# Patient Record
Sex: FEMALE | Race: WHITE | Employment: UNEMPLOYED | URBAN - METROPOLITAN AREA
[De-identification: names, ages, dates, MRNs, and addresses within clinical notes are randomized per-mention and may not be internally consistent; named-entity substitution may affect disease eponyms.]

---

## 2017-01-06 ENCOUNTER — HOSPITAL ENCOUNTER (OUTPATIENT)
Dept: PHYSICAL THERAPY | Age: 57
Discharge: HOME | End: 2017-01-06
Payer: COMMERCIAL

## 2017-01-06 DIAGNOSIS — M54.32 LEFT SIDED SCIATICA: ICD-10-CM

## 2017-01-06 PROCEDURE — 97161 PT EVAL LOW COMPLEX 20 MIN: CPT

## 2017-01-06 PROCEDURE — 97140 MANUAL THERAPY 1/> REGIONS: CPT

## 2017-01-06 PROCEDURE — 97110 THERAPEUTIC EXERCISES: CPT

## 2017-01-06 NOTE — PROGRESS NOTES
Ambulatory/Rehab Services H2 Model Falls Risk Assessment    Risk Factor Pts. ·   Confusion/Disorientation/Impulsivity  []    4 ·   Symptomatic Depression  []   2 ·   Altered Elimination  []   1 ·   Dizziness/Vertigo  []   1 ·   Gender (Male)  []   1 ·   Any administered antiepileptics (anticonvulsants):  []   2 ·   Any administered benzodiazepines:  []   1 ·   Visual Impairment (specify):  []   1 ·   Portable Oxygen Use  []   1 ·   Orthostatic ? BP  []   1 ·   History of Recent Falls (within 3 mos.)  []   5     Ability to Rise from Chair (choose one) Pts. ·   Ability to rise in a single movement  [x]   0 ·   Pushes up, successful in one attempt  []   1 ·   Multiple attempts, but successful  []   3 ·   Unable to rise without assistance  []   4   Total: (5 or greater = High Risk) 0     Falls Prevention Plan:   []                Physical Limitations to Exercise (specify):   []                Mobility Assistance Device (type):   []                Exercise/Equipment Adaptation (specify):    ©2010 [facility] [address] Patent #[id number].  Federal Law prohibits the replication, distribution or use without written permission from [facility] [employer]

## 2017-01-06 NOTE — PROGRESS NOTES
Lizzy Pack  : 1960 [facility] Center at [facility]  [address], [address], [place] [address]  [phone]   [phone]         OUTPATIENT PHYSICAL THERAPY: Initial Assessment 2017   ICD-10: Treatment Diagnosis: low back pain (M54.5)  Muscle spasm of back (M62.830)  Precautions/Allergies:   Latex, natural rubber; Adhesive; Lortab [hydrocodone-acetaminophen]; and Tylox [oxycodone-acetaminophen]   Fall Risk Score: 0 (? 5 = High Risk)  MD Orders: evaluate and treat  MEDICAL/REFERRING DIAGNOSIS:  Left sided sciatica [M54.32]   DATE OF ONSET: 2016   REFERRING PHYSICIAN: Julio Parish MD  RETURN PHYSICIAN APPOINTMENT: to see MD in 2 weeks for other problem    Initial Assessment:  Ms. Denise Christianson presents with functional limitation due to left sided low back pain. Through evaluation she demonstrate decreased accessory vertebral movement through lower lumbar spinal segments and left lateral lumbar shift along with muscle imbalances. She is showing no nerve involvement currently. She would highly benefit from skilled PT to address below problems. PROBLEM LIST (Impacting functional limitations):  1. Decreased Strength  2. Decreased ADL/Functional Activities  3. Decreased Transfer Abilities  4. Increased Pain  5. Decreased Flexibility/Joint Mobility  6. Decreased [place] with Home Exercise Program   INTERVENTIONS PLANNED:  1. Cold  2. Continuous Passive Motion (CPM)  3. Heat  4. Home Exercise Program (HEP)  5. Manual Therapy  6. Neuromuscular Re-education/Strengthening  7. Range of Motion (ROM)  8. Therapeutic Exercise/Strengthening     TREATMENT PLAN: Effective Dates: 17 TO 3/6/17. Frequency/Duration: 2 times a week for 4-6 weeks    GOALS: (Goals have been discussed and agreed upon with patient.)  SHORT-TERM FUNCTIONAL GOALS: Time Frame: 2-4 weeks   1. Pt will be independent with HEP focusing on core stability and promoting good spinal alignment.   2. Pt will report pain level does not exceed 4/10 in a 1-2 week period of time to demonstrate pain management. 3. Pt will report no increased symptoms during ADLs such as dressing lower half of body or bed mobilty. DISCHARGE GOALS: Time Frame: 6-8 weeks   1. Pt will improve Oswestry score by at least 10 points. 2. Pt will demonstrate full AROM of lumbar spine all planes without report of pain to improve functional mobility. 3. Pt will be able to return to regular activities such as spin class and walking without increased low back pain. Rehabilitation Potential For Stated Goals: Excellent    Regarding [address] [place] [address] Ramone's therapy, I certify that the treatment plan above will be carried out by a therapist or under their direction. Thank you for this referral,  Matt Suggs, PT       Referring Physician Signature: Gypsy Epps MD _________________________ Date _________            HISTORY:   Present Symptoms:    intermittent aching of left side of low back with occasional sharp instances during transitional movement. Denies LE symptoms such as pain, numbness, tingling, weakness. Aggravating Factors: sit to stand, getting out of bed, getting into car, bending, twisting, turning in bed  Relieving Factors: prone, ice, heat advil,   Pain: 3/10 presently, 9/10 worst, 0/10 best   History of Present Injury/Illness (Reason for Referral):  Hx of sciatic pain resolving with PT 6+ months ago  Pt states that her most recent back pain episode began November 2016 insidiously beginning with central low back, progressively worsening and radiating to left side. 3 days ago, symptoms significantly worsened while putting dishes away out of . Currently treating with ice, heat, advil. Reports that she has difficulty putting pants and socks/shoes on as well as performing homemaking activities. Past Medical History/Comorbidities:   Ms. 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 Ms. Bouchra Allen  has a past surgical history that includes hernia repair and skin biopsy. Social History/Living Environment:     living in home with 1 flight of stairs, with  who is currently needing assistance due to recent surgery   Prior Level of Function/Work/Activity:  Stay at home, enjoys long walks and spinning    Dominant Side:         RIGHT  Other Clinical Tests:          No clinical tests currently   Previous Treatment Approaches:          PT, ice, heat, meds   Personal Factors:          none  Current Medications:    Current Outpatient Prescriptions:     levothyroxine (SYNTHROID) 50 mcg tablet, Take 1 Tab by mouth Daily (before breakfast). Takes one tablet and alternates with one half of a tablet daily. Indications: HYPOTHYROIDISM, Disp: 90 Tab, Rfl: 3    LORazepam (ATIVAN) 0.5 mg tablet, Take 1 Tab by mouth two (2) times daily as needed. Max Daily Amount: 1 mg., Disp: 60 Tab, Rfl: 3    predniSONE (DELTASONE) 20 mg tablet, 2 PO every day x 2 then 1 PO every day x 2, Disp: 6 Tab, Rfl: 0    gabapentin (NEURONTIN) 300 mg capsule, Take 1 Cap by mouth nightly., Disp: 30 Cap, Rfl: 1    Cetirizine (ZYRTEC) 10 mg cap, Take  by mouth every evening. Indications: ALLERGIC RHINITIS, Disp: , Rfl:     OMEGA-3 FATTY ACIDS (FISH OIL CONCENTRATE PO), Take  by mouth., Disp: , Rfl:     CHOLECALCIFEROL, VITAMIN D3, (MAXIMUM D3 PO), Take  by mouth., Disp: , Rfl:     aspirin 81 mg tablet, Take 81 mg by mouth daily. , Disp: , Rfl:    Date Last Reviewed:  1/6/2017    # of Personal Factors/Comorbidities that affect the Plan of Care: 0: LOW COMPLEXITY   EXAMINATION:   Observation/Orthostatic Postural Assessment:    · Left scapula elevated compared to right   · Left illiac crest and PSIS elevated compared to right  · Left lumbar shift apparent    Palpation:    ·       Increased tone of left and right QL and paraspinals    ROM:            Lumbar spine Date:  1/6/17 Date:   Date:     Direction  Parameters Parameters Parameters   Flexion  nuetral spine only in standing     Extension  WNL      Rotation  R: 100%  L: 50% cs     Side bending  R: 25% cs  L: 100%     Hip internal rotation  R: WNL    L: limited      Hip external rotation R:WNL  L: WNL     Hip flexion  R: WNL  L: WNL cs     muscular restriction  Tight iliopsoas left         Strength:            LE DATE  1/6 DATE     Hip flexion 5/5 R:   L:    Hip Extension  5/5 R:   L:    Knee Flexion  5/5  R:   L:    Knee Extension  5/5 R:   L:    Ankle Dorsiflexion  5/5 R:   L:   Ankle Plantarflexion  5/5 R:  L:          Special Tests:          (-) SIJ testing B  Neurological Screen:        Normal   Functional Mobility:         Gait/Ambulation:  Normal        Transfers:  Normal         Bed Mobility:  Guarded supine to stand, Painful        Stairs:  Normal   Balance:          Good    Body Structures Involved:  1. Bones  2. Muscles  3. Ligaments Body Functions Affected:  1. Sensory/Pain  2. Neuromusculoskeletal  3. Movement Related Activities and Participation Affected:  1. Mobility   # of elements that affect the Plan of Care: 4+: HIGH COMPLEXITY   CLINICAL PRESENTATION:   Presentation: Stable and uncomplicated: LOW COMPLEXITY   CLINICAL DECISION MAKING:   Outcome Measure: Tool Used: Modified Oswestry Low Back Pain Questionnaire  Score:  Initial: 17/50  Most Recent: X/50 (Date: -- )   Interpretation of Score: Each section is scored on a 0-5 scale, 5 representing the greatest disability. The scores of each section are added together for a total score of 50. Medical Necessity:   · Patient is expected to demonstrate progress in strength, range of motion and functional technique to increase independence with home and recreational activities.   Reason for Services/Other Comments:  · Patient continues to require modification of therapeutic interventions to increase complexity of exercises. Use of outcome tool(s) and clinical judgement create a POC that gives a: Clear prediction of patient's progress: LOW COMPLEXITY   TREATMENT:   (In addition to Assessment/Re-Assessment sessions the following treatments were rendered)    THERAPEUTIC EXERCISE: (8 minutes):  Exercises per grid below to improve mobility and strength. Required moderate visual and manual cues to promote proper body alignment, promote proper body posture, promote proper body mechanics and promote proper body breathing techniques. Progressed resistance, range, repetitions and complexity of movement as indicated. Date:  1/6/17 Date:   Date:     Activity/Exercise Parameters Parameters Parameters   DKTC 3 x 10 sec (with breathing)     kegals Discussed for home     TrA iso with PPT 3 x 10 sec      Seated or standing lumbar sidebend (or side glide pelvis to left) Next session                          MANUAL THERAPY: (10 minutes): Joint mobilization, Soft tissue mobilization and Manipulation was utilized and necessary because of the patient's restricted joint motion, painful spasm, loss of articular motion and restricted motion of soft tissue. · CPA's and B UPA's to L2-4  · Transverse mob to lumbar vert right      MODALITIES: (0 minutes):      Treatment/Session Assessment:  Pt with 25% improvement right lumbar side bending with less pain after manual treatment. Good understanding of HEP and POC. · Post session pain: less pain with above movement   · Compliance with Program/Exercises: Will assess as treatment progresses. · Recommendations/Intent for next treatment session: \"Next visit will focus on advancements to more challenging activities\".   Total Treatment Duration:  PT Patient Time In/Time Out  Time In: 0800  Time Out: 0900    Matt Suggs PT

## 2017-01-10 ENCOUNTER — HOSPITAL ENCOUNTER (OUTPATIENT)
Dept: PHYSICAL THERAPY | Age: 57
Discharge: HOME | End: 2017-01-10
Payer: COMMERCIAL

## 2017-01-10 PROCEDURE — 97140 MANUAL THERAPY 1/> REGIONS: CPT

## 2017-01-10 NOTE — PROGRESS NOTES
Aydin Pack  [facility] 1960 [facility] [address] [address], [place] [address]  [phone]   [phone]         OUTPATIENT PHYSICAL THERAPY: Daily Note 1/10/2017   ICD-10: Treatment Diagnosis: low back pain (M54.5)  Muscle spasm of back (M62.830)  Precautions/Allergies:   Latex, natural rubber; Adhesive; Lortab [hydrocodone-acetaminophen]; and Tylox [oxycodone-acetaminophen]   Fall Risk Score: 0 (? 5 = High Risk)  MD Orders: evaluate and treat  MEDICAL/REFERRING DIAGNOSIS:  Low Back Pain   DATE OF ONSET: 2016   REFERRING PHYSICIAN: Abram Manley MD  RETURN PHYSICIAN APPOINTMENT: to see MD in 2 weeks for other problem    Initial Assessment:  Ms. Sasha Graham presents with functional limitation due to left sided low back pain. Through evaluation she demonstrate decreased accessory vertebral movement through lower lumbar spinal segments and left lateral lumbar shift along with muscle imbalances. She is showing no nerve involvement currently. She would highly benefit from skilled PT to address below problems. PROBLEM LIST (Impacting functional limitations):  1. Decreased Strength  2. Decreased ADL/Functional Activities  3. Decreased Transfer Abilities  4. Increased Pain  5. Decreased Flexibility/Joint Mobility  6. Decreased [place] with Home Exercise Program   INTERVENTIONS PLANNED:  1. Cold  2. Continuous Passive Motion (CPM)  3. Heat  4. Home Exercise Program (HEP)  5. Manual Therapy  6. Neuromuscular Re-education/Strengthening  7. Range of Motion (ROM)  8. Therapeutic Exercise/Strengthening     TREATMENT PLAN: Effective Dates: 17 TO 3/6/17. Frequency/Duration: 2 times a week for 4-6 weeks    GOALS: (Goals have been discussed and agreed upon with patient.)  SHORT-TERM FUNCTIONAL GOALS: Time Frame: 2-4 weeks   1. Pt will be independent with HEP focusing on core stability and promoting good spinal alignment.   2. Pt will report pain level does not exceed 4/10 in a 1-2 week period of time to demonstrate pain management. 3. Pt will report no increased symptoms during ADLs such as dressing lower half of body or bed mobilty. DISCHARGE GOALS: Time Frame: 6-8 weeks   1. Pt will improve Oswestry score by at least 10 points. 2. Pt will demonstrate full AROM of lumbar spine all planes without report of pain to improve functional mobility. 3. Pt will be able to return to regular activities such as spin class and walking without increased low back pain. Rehabilitation Potential For Stated Goals: Excellent    Regarding [address] [place] [address] [facility]'s therapy, I certify that the treatment plan above will be carried out by a therapist or under their direction. Thank you for this referral,  Jyothi Fowler, PT       Referring Physician Signature: Baltazar Dover MD _________________________ Date _________            HISTORY:   Present Symptoms:(at time of initial evaluation)     intermittent aching of left side of low back with occasional sharp instances during transitional movement. Denies LE symptoms such as pain, numbness, tingling, weakness. Aggravating Factors: sit to stand, getting out of bed, getting into car, bending, twisting, turning in bed  Relieving Factors: prone, ice, heat advil,   Pain: 3/10 presently, 9/10 worst, 0/10 best   History of Present Injury/Illness (Reason for Referral):  Hx of sciatic pain resolving with PT 6+ months ago  Pt states that her most recent back pain episode began November 2016 insidiously beginning with central low back, progressively worsening and radiating to left side. 3 days ago, symptoms significantly worsened while putting dishes away out of . Currently treating with ice, heat, advil. Reports that she has difficulty putting pants and socks/shoes on as well as performing homemaking activities.    Past Medical History/Comorbidities:   Ms. Tamanna Reid  has a past medical history of Cancer (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 Ms. Kassandra Hampton  has a past surgical history that includes hernia repair and skin biopsy. Social History/Living Environment:     living in home with 1 flight of stairs, with  who is currently needing assistance due to recent surgery   Prior Level of Function/Work/Activity:  Stay at home, enjoys long walks and spinning    Dominant Side:         RIGHT  Other Clinical Tests:          No clinical tests currently   Previous Treatment Approaches:          PT, ice, heat, meds   Personal Factors:          none  Current Medications:    Current Outpatient Prescriptions:     levothyroxine (SYNTHROID) 50 mcg tablet, Take 1 Tab by mouth Daily (before breakfast). Takes one tablet and alternates with one half of a tablet daily. Indications: HYPOTHYROIDISM, Disp: 90 Tab, Rfl: 3    LORazepam (ATIVAN) 0.5 mg tablet, Take 1 Tab by mouth two (2) times daily as needed. Max Daily Amount: 1 mg., Disp: 60 Tab, Rfl: 3    predniSONE (DELTASONE) 20 mg tablet, 2 PO every day x 2 then 1 PO every day x 2, Disp: 6 Tab, Rfl: 0    gabapentin (NEURONTIN) 300 mg capsule, Take 1 Cap by mouth nightly., Disp: 30 Cap, Rfl: 1    Cetirizine (ZYRTEC) 10 mg cap, Take  by mouth every evening. Indications: ALLERGIC RHINITIS, Disp: , Rfl:     OMEGA-3 FATTY ACIDS (FISH OIL CONCENTRATE PO), Take  by mouth., Disp: , Rfl:     CHOLECALCIFEROL, VITAMIN D3, (MAXIMUM D3 PO), Take  by mouth., Disp: , Rfl:     aspirin 81 mg tablet, Take 81 mg by mouth daily. , Disp: , Rfl:    Date Last Reviewed:  1/10/2017    # of Personal Factors/Comorbidities that affect the Plan of Care: 0: LOW COMPLEXITY   EXAMINATION:   Observation/Orthostatic Postural Assessment:    · Left scapula elevated compared to right   · Left illiac crest and PSIS elevated compared to right  · Left lumbar shift apparent    Palpation:    ·       Increased tone of left and right QL and paraspinals    ROM:            Lumbar spine Date:  1/6/17 Date:   Date:     Direction  Parameters Parameters Parameters   Flexion  nuetral spine only in standing     Extension  WNL      Rotation  R: 100%  L: 50% cs     Side bending  R: 25% cs  L: 100%     Hip internal rotation  R: WNL    L: limited      Hip external rotation R:WNL  L: WNL     Hip flexion  R: WNL  L: WNL cs     muscular restriction  Tight iliopsoas left         Strength:            LE DATE  1/6 DATE     Hip flexion 5/5 R:   L:    Hip Extension  5/5 R:   L:    Knee Flexion  5/5  R:   L:    Knee Extension  5/5 R:   L:    Ankle Dorsiflexion  5/5 R:   L:   Ankle Plantarflexion  5/5 R:  L:          Special Tests:          (-) SIJ testing B  Neurological Screen:        Normal   Functional Mobility:         Gait/Ambulation:  Normal        Transfers:  Normal         Bed Mobility:  Guarded supine to stand, Painful        Stairs:  Normal   Balance:          Good    Body Structures Involved:  1. Bones  2. Muscles  3. Ligaments Body Functions Affected:  1. Sensory/Pain  2. Neuromusculoskeletal  3. Movement Related Activities and Participation Affected:  1. Mobility   # of elements that affect the Plan of Care: 4+: HIGH COMPLEXITY   CLINICAL PRESENTATION:   Presentation: Stable and uncomplicated: LOW COMPLEXITY   CLINICAL DECISION MAKING:   Outcome Measure: Tool Used: Modified Oswestry Low Back Pain Questionnaire  Score:  Initial: 17/50  Most Recent: X/50 (Date: -- )   Interpretation of Score: Each section is scored on a 0-5 scale, 5 representing the greatest disability. The scores of each section are added together for a total score of 50. Medical Necessity:   · Patient is expected to demonstrate progress in strength, range of motion and functional technique to increase independence with home and recreational activities.   Reason for Services/Other Comments:  · Patient continues to require modification of therapeutic interventions to increase complexity of exercises. Use of outcome tool(s) and clinical judgement create a POC that gives a: Clear prediction of patient's progress: LOW COMPLEXITY   TREATMENT:   (In addition to Assessment/Re-Assessment sessions the following treatments were rendered)  Present symptoms 1/10/2017: 2-3 pain level currently. Pt was not very sore after last session. Pt performing her HEP regularly with less trouble. THERAPEUTIC EXERCISE: ( 5 minutes):  Exercises per grid below to improve mobility and strength. Required moderate visual and manual cues to promote proper body alignment, promote proper body posture, promote proper body mechanics and promote proper body breathing techniques. Progressed resistance, range, repetitions and complexity of movement as indicated. Date:  1/6/17 Date:  1/10/17 Date:     Activity/Exercise Parameters Parameters Parameters   DKTC 3 x 10 sec (with breathing) With manual assist and contract relax x 3    kegals Discussed for home     TrA iso with PPT 3 x 10 sec  Next visit     Seated or standing lumbar sidebend (or side glide pelvis to left) Next session      Standing lumbar flexion   X 10     Hamstring stretch   Next visit             MANUAL THERAPY: (40 minutes): Joint mobilization, Soft tissue mobilization and Manipulation was utilized and necessary because of the patient's restricted joint motion, painful spasm, loss of articular motion and restricted motion of soft tissue. · CPA's and B UPA's to L2-4  · UAP's lower lumbar attempted  · Left iliopsoas release   · Grade IV left rotation/flexion mob (right sidelying)  ·       MODALITIES: (0 minutes):      Treatment/Session Assessment:  During manual, pt demonstrated significant stiffness of lumbar spine. Pt with 25-50% increase lumbar flexion after session today. · Post session pain: no pain at end of session    · Compliance with Program/Exercises: Will assess as treatment progresses.   · Recommendations/Intent for next treatment session: \"Next visit will focus on advancements to more challenging activities\".   Total Treatment Duration:  PT Patient Time In/Time Out  Time In: 1115  Time Out: [address]

## 2017-01-12 ENCOUNTER — HOSPITAL ENCOUNTER (OUTPATIENT)
Dept: PHYSICAL THERAPY | Age: 57
Discharge: HOME | End: 2017-01-12
Payer: COMMERCIAL

## 2017-01-12 PROCEDURE — 97140 MANUAL THERAPY 1/> REGIONS: CPT

## 2017-01-12 NOTE — PROGRESS NOTES
Aydin Pack  : 1960 Therapy [facility] at [facility]  [address], [place] [address]  [phone]   [phone]         OUTPATIENT PHYSICAL THERAPY: Daily Note 2017   ICD-10: Treatment Diagnosis: low back pain (M54.5)  Muscle spasm of back (M62.830)  Precautions/Allergies:   Latex, natural rubber; Adhesive; Lortab [hydrocodone-acetaminophen]; and Tylox [oxycodone-acetaminophen]   Fall Risk Score: 0 (? 5 = High Risk)  MD Orders: evaluate and treat  MEDICAL/REFERRING DIAGNOSIS:  Low Back Pain   DATE OF ONSET: 2016   REFERRING PHYSICIAN: Lemon Goldberg, MD  RETURN PHYSICIAN APPOINTMENT: to see MD in 2 weeks for other problem    Initial Assessment:  Ms. Vito Walls presents with functional limitation due to left sided low back pain. Through evaluation she demonstrate decreased accessory vertebral movement through lower lumbar spinal segments and left lateral lumbar shift along with muscle imbalances. She is showing no nerve involvement currently. She would highly benefit from skilled PT to address below problems. PROBLEM LIST (Impacting functional limitations):  1. Decreased Strength  2. Decreased ADL/Functional Activities  3. Decreased Transfer Abilities  4. Increased Pain  5. Decreased Flexibility/Joint Mobility  6. Decreased [place] with Home Exercise Program   INTERVENTIONS PLANNED:  1. Cold  2. Continuous Passive Motion (CPM)  3. Heat  4. Home Exercise Program (HEP)  5. Manual Therapy  6. Neuromuscular Re-education/Strengthening  7. Range of Motion (ROM)  8. Therapeutic Exercise/Strengthening     TREATMENT PLAN: Effective Dates: 17 TO 3/6/17. Frequency/Duration: 2 times a week for 4-6 weeks    GOALS: (Goals have been discussed and agreed upon with patient.)  SHORT-TERM FUNCTIONAL GOALS: Time Frame: 2-4 weeks   1. Pt will be independent with HEP focusing on core stability and promoting good spinal alignment.   2. Pt will report pain level does not exceed 4/10 in a 1-2 week period of time to demonstrate pain management. 3. Pt will report no increased symptoms during ADLs such as dressing lower half of body or bed mobilty. DISCHARGE GOALS: Time Frame: 6-8 weeks   1. Pt will improve Oswestry score by at least 10 points. 2. Pt will demonstrate full AROM of lumbar spine all planes without report of pain to improve functional mobility. 3. Pt will be able to return to regular activities such as spin class and walking without increased low back pain. Rehabilitation Potential For Stated Goals: Excellent    Regarding [address] [place] [address] Ramone's therapy, I certify that the treatment plan above will be carried out by a therapist or under their direction. Thank you for this referral,  Marylou Saxena, PT       Referring Physician Signature: Penelope Monsalve MD _________________________ Date _________            HISTORY:   Present Symptoms:(at time of initial evaluation)     intermittent aching of left side of low back with occasional sharp instances during transitional movement. Denies LE symptoms such as pain, numbness, tingling, weakness. Aggravating Factors: sit to stand, getting out of bed, getting into car, bending, twisting, turning in bed  Relieving Factors: prone, ice, heat advil,   Pain: 3/10 presently, 9/10 worst, 0/10 best   History of Present Injury/Illness (Reason for Referral):  Hx of sciatic pain resolving with PT 6+ months ago  Pt states that her most recent back pain episode began November 2016 insidiously beginning with central low back, progressively worsening and radiating to left side. 3 days ago, symptoms significantly worsened while putting dishes away out of . Currently treating with ice, heat, advil. Reports that she has difficulty putting pants and socks/shoes on as well as performing homemaking activities.    Past Medical History/Comorbidities:   Ms. Sahara Delarosa  has a past medical history of Cancer ([facility] [address] (2014); Factor II deficiency ([facility] [address] Melanoma ([facility] [address] (11/2014); Prothrombin gene mutation ([facility] [address] Pulmonary embolus ([facility] [address] (2009); and Thyroid disease. Ms. Tamanna Reid  has a past surgical history that includes hernia repair and skin biopsy. Social History/Living Environment:     living in home with 1 flight of stairs, with  who is currently needing assistance due to recent surgery   Prior Level of Function/Work/Activity:  Stay at home, enjoys long walks and spinning    Dominant Side:         RIGHT  Other Clinical Tests:          No clinical tests currently   Previous Treatment Approaches:          PT, ice, heat, meds   Personal Factors:          none  Current Medications:    Current Outpatient Prescriptions:     levothyroxine (SYNTHROID) 50 mcg tablet, Take 1 Tab by mouth Daily (before breakfast). Takes one tablet and alternates with one half of a tablet daily. Indications: HYPOTHYROIDISM, Disp: 90 Tab, Rfl: 3    LORazepam (ATIVAN) 0.5 mg tablet, Take 1 Tab by mouth two (2) times daily as needed. Max Daily Amount: 1 mg., Disp: 60 Tab, Rfl: 3    predniSONE (DELTASONE) 20 mg tablet, 2 PO every day x 2 then 1 PO every day x 2, Disp: 6 Tab, Rfl: 0    gabapentin (NEURONTIN) 300 mg capsule, Take 1 Cap by mouth nightly., Disp: 30 Cap, Rfl: 1    Cetirizine (ZYRTEC) 10 mg cap, Take  by mouth every evening. Indications: ALLERGIC RHINITIS, Disp: , Rfl:     OMEGA-3 FATTY ACIDS (FISH OIL CONCENTRATE PO), Take  by mouth., Disp: , Rfl:     CHOLECALCIFEROL, VITAMIN D3, (MAXIMUM D3 PO), Take  by mouth., Disp: , Rfl:     aspirin 81 mg tablet, Take 81 mg by mouth daily. , Disp: , Rfl:    Date Last Reviewed:  1/12/2017    # of Personal Factors/Comorbidities that affect the Plan of Care: 0: LOW COMPLEXITY   EXAMINATION:   Observation/Orthostatic Postural Assessment:    · Left scapula elevated compared to right   · Left illiac crest and PSIS elevated compared to right  · Left lumbar shift apparent    Palpation:    ·       Increased tone of left and right QL and paraspinals    ROM:            Lumbar spine Date:  1/6/17 Date:   Date:     Direction  Parameters Parameters Parameters   Flexion  nuetral spine only in standing     Extension  WNL      Rotation  R: 100%  L: 50% cs     Side bending  R: 25% cs  L: 100%     Hip internal rotation  R: WNL    L: limited      Hip external rotation R:WNL  L: WNL     Hip flexion  R: WNL  L: WNL cs     muscular restriction  Tight iliopsoas left         Strength:            LE DATE  1/6 DATE     Hip flexion 5/5 R:   L:    Hip Extension  5/5 R:   L:    Knee Flexion  5/5  R:   L:    Knee Extension  5/5 R:   L:    Ankle Dorsiflexion  5/5 R:   L:   Ankle Plantarflexion  5/5 R:  L:          Special Tests:          (-) SIJ testing B  Neurological Screen:        Normal   Functional Mobility:         Gait/Ambulation:  Normal        Transfers:  Normal         Bed Mobility:  Guarded supine to stand, Painful        Stairs:  Normal   Balance:          Good    Body Structures Involved:  1. Bones  2. Muscles  3. Ligaments Body Functions Affected:  1. Sensory/Pain  2. Neuromusculoskeletal  3. Movement Related Activities and Participation Affected:  1. Mobility   # of elements that affect the Plan of Care: 4+: HIGH COMPLEXITY   CLINICAL PRESENTATION:   Presentation: Stable and uncomplicated: LOW COMPLEXITY   CLINICAL DECISION MAKING:   Outcome Measure: Tool Used: Modified Oswestry Low Back Pain Questionnaire  Score:  Initial: 17/50  Most Recent: X/50 (Date: -- )   Interpretation of Score: Each section is scored on a 0-5 scale, 5 representing the greatest disability. The scores of each section are added together for a total score of 50. Medical Necessity:   · Patient is expected to demonstrate progress in strength, range of motion and functional technique to increase independence with home and recreational activities.   Reason for Services/Other Comments:  · Patient continues to require modification of therapeutic interventions to increase complexity of exercises. Use of outcome tool(s) and clinical judgement create a POC that gives a: Clear prediction of patient's progress: LOW COMPLEXITY   TREATMENT:   (In addition to Assessment/Re-Assessment sessions the following treatments were rendered)  Present symptoms 1/12/2017: 2/10 pain level currently central low back. She states that she feels like she is getting much better. THERAPEUTIC EXERCISE: ( 5 minutes):  Exercises per grid below to improve mobility and strength. Required moderate visual and manual cues to promote proper body alignment, promote proper body posture, promote proper body mechanics and promote proper body breathing techniques. Progressed resistance, range, repetitions and complexity of movement as indicated. Date:  1/6/17 Date:  1/10/17 Date:  1/12/17   Activity/Exercise Parameters Parameters Parameters   DKTC 3 x 10 sec (with breathing) With manual assist and contract relax x 3    kegals Discussed for home     TrA iso with PPT 3 x 10 sec  Next visit     Seated or standing lumbar sidebend (or side glide pelvis to left) Next session      Standing lumbar flexion   X 10  X 5    Hamstring stretch   Next visit  3 x 20 sec            MANUAL THERAPY: (40 minutes): Joint mobilization, Soft tissue mobilization and Manipulation was utilized and necessary because of the patient's restricted joint motion, painful spasm, loss of articular motion and restricted motion of soft tissue. · CPA's and B UPA's to L2-4  · UAP's lower lumbar attempted  · Left iliopsoas release   · Grade IV left rotation/flexion mob (right sidelying)  ·       MODALITIES: (0 minutes):      Treatment/Session Assessment:  Much less stiffness of lumbar spine today. · Post session pain: no pain at end of session    · Compliance with Program/Exercises: Will assess as treatment progresses. · Recommendations/Intent for next treatment session:  \"Next visit will focus on advancements to more challenging activities\".   Total Treatment Duration:  PT Patient Time In/Time Out  Time In: 1115  Time Out: [address]

## 2017-01-17 ENCOUNTER — HOSPITAL ENCOUNTER (OUTPATIENT)
Dept: PHYSICAL THERAPY | Age: 57
Discharge: HOME | End: 2017-01-17
Payer: COMMERCIAL

## 2017-01-17 PROCEDURE — 97110 THERAPEUTIC EXERCISES: CPT

## 2017-01-17 PROCEDURE — 97140 MANUAL THERAPY 1/> REGIONS: CPT

## 2017-01-17 NOTE — PROGRESS NOTES
[address] [facility] Ramone  : 1960 [facility] [address], [place] [address]  [phone]   [phone]         OUTPATIENT PHYSICAL THERAPY: Daily Note 2017   ICD-10: Treatment Diagnosis: low back pain (M54.5)  Muscle spasm of back (M62.830)  Precautions/Allergies:   Latex, natural rubber; Adhesive; Lortab [hydrocodone-acetaminophen]; and Tylox [oxycodone-acetaminophen]   Fall Risk Score: 0 (? 5 = High Risk)  MD Orders: evaluate and treat  MEDICAL/REFERRING DIAGNOSIS:  Low Back Pain   DATE OF ONSET: 2016   REFERRING PHYSICIAN: Shae Sparrow MD  RETURN PHYSICIAN APPOINTMENT: to see MD in 2 weeks for other problem    Initial Assessment:  Ms. Courtney Birch presents with functional limitation due to left sided low back pain. Through evaluation she demonstrate decreased accessory vertebral movement through lower lumbar spinal segments and left lateral lumbar shift along with muscle imbalances. She is showing no nerve involvement currently. She would highly benefit from skilled PT to address below problems. PROBLEM LIST (Impacting functional limitations):  1. Decreased Strength  2. Decreased ADL/Functional Activities  3. Decreased Transfer Abilities  4. Increased Pain  5. Decreased Flexibility/Joint Mobility  6. Decreased [place] with Home Exercise Program   INTERVENTIONS PLANNED:  1. Cold  2. Continuous Passive Motion (CPM)  3. Heat  4. Home Exercise Program (HEP)  5. Manual Therapy  6. Neuromuscular Re-education/Strengthening  7. Range of Motion (ROM)  8. Therapeutic Exercise/Strengthening     TREATMENT PLAN: Effective Dates: 17 TO 3/6/17. Frequency/Duration: 2 times a week for 4-6 weeks    GOALS: (Goals have been discussed and agreed upon with patient.)  SHORT-TERM FUNCTIONAL GOALS: Time Frame: 2-4 weeks   1. Pt will be independent with HEP focusing on core stability and promoting good spinal alignment.   2. Pt will report pain level does not exceed 4/10 in a 1-2 week period of time to demonstrate pain management. 3. Pt will report no increased symptoms during ADLs such as dressing lower half of body or bed mobilty. DISCHARGE GOALS: Time Frame: 6-8 weeks   1. Pt will improve Oswestry score by at least 10 points. 2. Pt will demonstrate full AROM of lumbar spine all planes without report of pain to improve functional mobility. 3. Pt will be able to return to regular activities such as spin class and walking without increased low back pain. Rehabilitation Potential For Stated Goals: Excellent    Regarding [address] [place] [address] Ramone's therapy, I certify that the treatment plan above will be carried out by a therapist or under their direction. Thank you for this referral,  Nabila Solo, PT       Referring Physician Signature: Nanette Maravilla MD _________________________ Date _________            HISTORY:   Present Symptoms:(at time of initial evaluation)     intermittent aching of left side of low back with occasional sharp instances during transitional movement. Denies LE symptoms such as pain, numbness, tingling, weakness. Aggravating Factors: sit to stand, getting out of bed, getting into car, bending, twisting, turning in bed  Relieving Factors: prone, ice, heat advil,   Pain: 3/10 presently, 9/10 worst, 0/10 best   History of Present Injury/Illness (Reason for Referral):  Hx of sciatic pain resolving with PT 6+ months ago  Pt states that her most recent back pain episode began November 2016 insidiously beginning with central low back, progressively worsening and radiating to left side. 3 days ago, symptoms significantly worsened while putting dishes away out of . Currently treating with ice, heat, advil. Reports that she has difficulty putting pants and socks/shoes on as well as performing homemaking activities.    Past Medical History/Comorbidities:   Ms. Bethany Casiano  has a past medical history of Cancer ([facility] [address] (2014); Factor II deficiency ([facility] [address] Melanoma ([facility] [address] (11/2014); Prothrombin gene mutation ([facility] [address] Pulmonary embolus ([facility] [address] (2009); and Thyroid disease. Ms. Bouchra Allen  has a past surgical history that includes hernia repair and skin biopsy. Social History/Living Environment:     living in home with 1 flight of stairs, with  who is currently needing assistance due to recent surgery   Prior Level of Function/Work/Activity:  Stay at home, enjoys long walks and spinning    Dominant Side:         RIGHT  Other Clinical Tests:          No clinical tests currently   Previous Treatment Approaches:          PT, ice, heat, meds   Personal Factors:          none  Current Medications:    Current Outpatient Prescriptions:     levothyroxine (SYNTHROID) 50 mcg tablet, Take 1 Tab by mouth Daily (before breakfast). Takes one tablet and alternates with one half of a tablet daily. Indications: HYPOTHYROIDISM, Disp: 90 Tab, Rfl: 3    LORazepam (ATIVAN) 0.5 mg tablet, Take 1 Tab by mouth two (2) times daily as needed. Max Daily Amount: 1 mg., Disp: 60 Tab, Rfl: 3    predniSONE (DELTASONE) 20 mg tablet, 2 PO every day x 2 then 1 PO every day x 2, Disp: 6 Tab, Rfl: 0    gabapentin (NEURONTIN) 300 mg capsule, Take 1 Cap by mouth nightly., Disp: 30 Cap, Rfl: 1    Cetirizine (ZYRTEC) 10 mg cap, Take  by mouth every evening. Indications: ALLERGIC RHINITIS, Disp: , Rfl:     OMEGA-3 FATTY ACIDS (FISH OIL CONCENTRATE PO), Take  by mouth., Disp: , Rfl:     CHOLECALCIFEROL, VITAMIN D3, (MAXIMUM D3 PO), Take  by mouth., Disp: , Rfl:     aspirin 81 mg tablet, Take 81 mg by mouth daily. , Disp: , Rfl:    Date Last Reviewed:  1/17/2017    # of Personal Factors/Comorbidities that affect the Plan of Care: 0: LOW COMPLEXITY   EXAMINATION:   Observation/Orthostatic Postural Assessment:    · Left scapula elevated compared to right   · Left illiac crest and PSIS elevated compared to right  · Left lumbar shift apparent    Palpation:    ·       Increased tone of left and right QL and paraspinals    ROM:            Lumbar spine Date:  1/6/17 Date:   Date:     Direction  Parameters Parameters Parameters   Flexion  nuetral spine only in standing     Extension  WNL      Rotation  R: 100%  L: 50% cs     Side bending  R: 25% cs  L: 100%     Hip internal rotation  R: WNL    L: limited      Hip external rotation R:WNL  L: WNL     Hip flexion  R: WNL  L: WNL cs     muscular restriction  Tight iliopsoas left         Strength:            LE DATE  1/6 DATE     Hip flexion 5/5 R:   L:    Hip Extension  5/5 R:   L:    Knee Flexion  5/5  R:   L:    Knee Extension  5/5 R:   L:    Ankle Dorsiflexion  5/5 R:   L:   Ankle Plantarflexion  5/5 R:  L:          Special Tests:          (-) SIJ testing B  Neurological Screen:        Normal   Functional Mobility:         Gait/Ambulation:  Normal        Transfers:  Normal         Bed Mobility:  Guarded supine to stand, Painful        Stairs:  Normal   Balance:          Good    Body Structures Involved:  1. Bones  2. Muscles  3. Ligaments Body Functions Affected:  1. Sensory/Pain  2. Neuromusculoskeletal  3. Movement Related Activities and Participation Affected:  1. Mobility   # of elements that affect the Plan of Care: 4+: HIGH COMPLEXITY   CLINICAL PRESENTATION:   Presentation: Stable and uncomplicated: LOW COMPLEXITY   CLINICAL DECISION MAKING:   Outcome Measure: Tool Used: Modified Oswestry Low Back Pain Questionnaire  Score:  Initial: 17/50  Most Recent: X/50 (Date: -- )   Interpretation of Score: Each section is scored on a 0-5 scale, 5 representing the greatest disability. The scores of each section are added together for a total score of 50. Medical Necessity:   · Patient is expected to demonstrate progress in strength, range of motion and functional technique to increase independence with home and recreational activities.   Reason for Services/Other Comments:  · Patient continues to require modification of therapeutic interventions to increase complexity of exercises. Use of outcome tool(s) and clinical judgement create a POC that gives a: Clear prediction of patient's progress: LOW COMPLEXITY   TREATMENT:   (In addition to Assessment/Re-Assessment sessions the following treatments were rendered)  Present symptoms 1/17/2017: pt states 0/10 pain walking in to therapy session but reports only pain she has at this point is bending and sitting prolonged and has only reached 3/10 at worst. Discussed evaluating for discharge planning next session. THERAPEUTIC EXERCISE: ( 20 minutes):  Exercises per grid below to improve mobility and strength. Required moderate visual and manual cues to promote proper body alignment, promote proper body posture, promote proper body mechanics and promote proper body breathing techniques. Progressed resistance, range, repetitions and complexity of movement as indicated. Date:  1/10/17 Date:  1/12/17 1/17/17   Activity/Exercise Parameters Parameters    DKTC With manual assist and contract relax x 3     kegals      TrA iso with PPT Next visit   With stabilizer x 5, 10 sec    Seated or standing lumbar sidebend (or side glide pelvis to left)      Standing lumbar flexion  X 10  X 5     Hamstring stretch  Next visit  3 x 20 sec     TrA iso with marches, leg fall out and heel slide     X 15 min practice      MANUAL THERAPY: (25 minutes): Joint mobilization, Soft tissue mobilization and Manipulation was utilized and necessary because of the patient's restricted joint motion, painful spasm, loss of articular motion and restricted motion of soft tissue. · CPA's and B UPA's to L2-4  · UAP's lower lumbar attempted  · Left iliopsoas release   · Trigger point release to left QL     MODALITIES: (0 minutes):      Treatment/Session Assessment:  Improved lumbar flexion actively with no pain report. Increased trigger points of left QL.      · Post session pain: no pain at end of session    · Compliance with Program/Exercises: Will assess as treatment progresses. · Recommendations/Intent for next treatment session: \"Next visit will focus on advancements to more challenging activities\".  reeval for dc  Total Treatment Duration:  PT Patient Time In/Time Out  Time In: 1115  Time Out: [address]

## 2017-01-19 ENCOUNTER — HOSPITAL ENCOUNTER (OUTPATIENT)
Dept: PHYSICAL THERAPY | Age: 57
Discharge: HOME | End: 2017-01-19
Payer: COMMERCIAL

## 2017-01-19 PROCEDURE — 97110 THERAPEUTIC EXERCISES: CPT

## 2017-01-19 NOTE — PROGRESS NOTES
Lizzy Pack  : 1960 [facility]  [address], [address], [place] [address]  [phone]   [phone]         OUTPATIENT PHYSICAL THERAPY: Daily Note and Discharge 2017   ICD-10: Treatment Diagnosis: low back pain (M54.5)  Muscle spasm of back (M62.830)  Precautions/Allergies:   Latex, natural rubber; Adhesive; Lortab [hydrocodone-acetaminophen]; and Tylox [oxycodone-acetaminophen]   Fall Risk Score: 0 (? 5 = High Risk)  MD Orders: evaluate and treat  MEDICAL/REFERRING DIAGNOSIS:  Low Back Pain   DATE OF ONSET: 2016   REFERRING PHYSICIAN: Corinne Chapa MD  RETURN PHYSICIAN APPOINTMENT: to see MD in 2 weeks for other problem    Initial Assessment:  Ms. Herlinda Canas attended 5 scheduled PT visits for treatment of above diagnosis. She is has progressed well and is currently independent with an HEP. She continues to have occasional increased back pain but feels that she is able to manage through postioning and exercises. She voices readiness for discharge from PT at this time. GOALS: (Goals have been discussed and agreed upon with patient.)  SHORT-TERM FUNCTIONAL GOALS:    1. Pt will be independent with HEP focusing on core stability and promoting good spinal alignment. (met)  2. Pt will report pain level does not exceed 4/10 in a 1-2 week period of time to demonstrate pain management. (met)  3. Pt will report no increased symptoms during ADLs such as dressing lower half of body or bed mobilty. (met)  DISCHARGE GOALS:    1. Pt will improve Oswestry score by at least 10 points. 2. Pt will demonstrate full AROM of lumbar spine all planes without report of pain to improve functional mobility. (met)   3. Pt will be able to return to regular activities such as spin class and walking without increased low back pain. (has not attempted due to time).     Thank you for this referral,  Mamadou Cross, PT                 EXAMINATION: Observation/Orthostatic Postural Assessment:    · Left scapula elevated compared to right   · Left illiac crest and PSIS elevated compared to right  · Left lumbar shift apparent    Palpation:    ·       Increased tone of left and right QL and paraspinals    ROM:            Lumbar spine Date:  1/6/17 Date:  1/18/17 Date:     Direction  Parameters Parameters Parameters   Flexion  nuetral spine only in standing 100%    Extension  WNL  100%    Rotation  R: 100%  L: 50% cs 100%    Side bending  R: 25% cs  L: 100% R: 75%  L: 100%    Hip internal rotation  R: WNL    L: limited      Hip external rotation R:WNL  L: WNL     Hip flexion  R: WNL  L: WNL cs     muscular restriction  Tight iliopsoas left         Strength:            LE DATE  1/6 DATE     Hip flexion 5/5 R:   L:    Hip Extension  5/5 R:   L:    Knee Flexion  5/5  R:   L:    Knee Extension  5/5 R:   L:    Ankle Dorsiflexion  5/5 R:   L:   Ankle Plantarflexion  5/5 R:  L:          Special Tests:          (-) SIJ testing B  Neurological Screen:        Normal   Functional Mobility:         Gait/Ambulation:  Normal        Transfers:  Normal         Bed Mobility:  Normal         Stairs:  Normal   Balance:          Good        TREATMENT:   (In addition to Assessment/Re-Assessment sessions the following treatments were rendered)  Present symptoms 1/19/2017: pt reports she feels better and better each day and is ready to be independent with her pain management through HEP. THERAPEUTIC EXERCISE: ( 20 minutes):  Exercises per grid below to improve mobility and strength. Required moderate visual and manual cues to promote proper body alignment, promote proper body posture, promote proper body mechanics and promote proper body breathing techniques. Progressed resistance, range, repetitions and complexity of movement as indicated.    Date:  1/10/17 Date:  1/12/17 1/17/17 1/19/17   Activity/Exercise Parameters Parameters     DKTC With manual assist and contract relax x 3      kegals       TrA iso with PPT Next visit   With stabilizer x 5, 10 sec     Seated or standing lumbar sidebend (or side glide pelvis to left)       Standing lumbar flexion  X 10  X 5      Hamstring stretch  Next visit  3 x 20 sec      TrA iso with marches, leg fall out and heel slide     X 15 min practice     AROM all planes lumbar     X 5 min    Left side glide hips    X 5, 5 sec      Treatment/Session Assessment:  Improved lumbar flexion actively with no pain report. Good understanding of HEP and discharge planning. · Post session pain: no pain at end of session    · Compliance with Program/Exercises: Will assess as treatment progresses.   ·   Total Treatment Duration:  PT Patient Time In/Time Out  Time In: 1120  Time Out: [address]

## 2017-01-24 ENCOUNTER — HOSPITAL ENCOUNTER (OUTPATIENT)
Dept: PHYSICAL THERAPY | Age: 57
End: 2017-01-24
Payer: COMMERCIAL

## 2017-01-26 ENCOUNTER — APPOINTMENT (OUTPATIENT)
Dept: PHYSICAL THERAPY | Age: 57
End: 2017-01-26
Payer: COMMERCIAL

## 2017-01-31 ENCOUNTER — APPOINTMENT (OUTPATIENT)
Dept: PHYSICAL THERAPY | Age: 57
End: 2017-01-31
Payer: COMMERCIAL

## 2017-05-17 ENCOUNTER — APPOINTMENT (RX ONLY)
Dept: URBAN - METROPOLITAN AREA CLINIC 23 | Facility: CLINIC | Age: 57
Setting detail: DERMATOLOGY
End: 2017-05-17

## 2017-05-17 DIAGNOSIS — D22 MELANOCYTIC NEVI: ICD-10-CM

## 2017-05-17 DIAGNOSIS — L57.0 ACTINIC KERATOSIS: ICD-10-CM

## 2017-05-17 DIAGNOSIS — Z87.2 PERSONAL HISTORY OF DISEASES OF THE SKIN AND SUBCUTANEOUS TISSUE: ICD-10-CM

## 2017-05-17 DIAGNOSIS — L82.1 OTHER SEBORRHEIC KERATOSIS: ICD-10-CM

## 2017-05-17 DIAGNOSIS — L81.4 OTHER MELANIN HYPERPIGMENTATION: ICD-10-CM

## 2017-05-17 DIAGNOSIS — Z86.006 PERSONAL HISTORY OF MELANOMA IN-SITU: ICD-10-CM

## 2017-05-17 DIAGNOSIS — D18.0 HEMANGIOMA: ICD-10-CM

## 2017-05-17 PROBLEM — L70.0 ACNE VULGARIS: Status: ACTIVE | Noted: 2017-05-17

## 2017-05-17 PROBLEM — Z85.820 PERSONAL HISTORY OF MALIGNANT MELANOMA OF SKIN: Status: ACTIVE | Noted: 2017-05-17

## 2017-05-17 PROBLEM — L85.3 XEROSIS CUTIS: Status: ACTIVE | Noted: 2017-05-17

## 2017-05-17 PROBLEM — D22.5 MELANOCYTIC NEVI OF TRUNK: Status: ACTIVE | Noted: 2017-05-17

## 2017-05-17 PROBLEM — D18.01 HEMANGIOMA OF SKIN AND SUBCUTANEOUS TISSUE: Status: ACTIVE | Noted: 2017-05-17

## 2017-05-17 PROCEDURE — 17000 DESTRUCT PREMALG LESION: CPT

## 2017-05-17 PROCEDURE — ? LIQUID NITROGEN

## 2017-05-17 PROCEDURE — ? OTHER

## 2017-05-17 PROCEDURE — ? MEDICAL PHOTOGRAPHY REVIEW

## 2017-05-17 PROCEDURE — ? COUNSELING

## 2017-05-17 PROCEDURE — 99214 OFFICE O/P EST MOD 30 MIN: CPT | Mod: 25

## 2017-05-17 ASSESSMENT — LOCATION DETAILED DESCRIPTION DERM
LOCATION DETAILED: RIGHT RADIAL DORSAL HAND
LOCATION DETAILED: RIGHT SUPERIOR MEDIAL MIDBACK
LOCATION DETAILED: RIGHT RIB CAGE
LOCATION DETAILED: LEFT PROXIMAL POSTERIOR UPPER ARM
LOCATION DETAILED: LEFT ULNAR DORSAL HAND
LOCATION DETAILED: RIGHT PROXIMAL DORSAL FOREARM
LOCATION DETAILED: LEFT MID-UPPER BACK
LOCATION DETAILED: LEFT LATERAL ELBOW
LOCATION DETAILED: LEFT DISTAL DORSAL FOREARM
LOCATION DETAILED: EPIGASTRIC SKIN
LOCATION DETAILED: LEFT INFERIOR UPPER BACK
LOCATION DETAILED: PERIUMBILICAL SKIN
LOCATION DETAILED: RIGHT CENTRAL MALAR CHEEK
LOCATION DETAILED: MIDDLE STERNUM
LOCATION DETAILED: LEFT CENTRAL MALAR CHEEK
LOCATION DETAILED: RIGHT LATERAL MANDIBULAR CHEEK

## 2017-05-17 ASSESSMENT — LOCATION SIMPLE DESCRIPTION DERM
LOCATION SIMPLE: RIGHT HAND
LOCATION SIMPLE: LEFT HAND
LOCATION SIMPLE: ABDOMEN
LOCATION SIMPLE: LEFT CHEEK
LOCATION SIMPLE: RIGHT FOREARM
LOCATION SIMPLE: LEFT UPPER BACK
LOCATION SIMPLE: RIGHT LOWER BACK
LOCATION SIMPLE: LEFT POSTERIOR UPPER ARM
LOCATION SIMPLE: LEFT ELBOW
LOCATION SIMPLE: CHEST
LOCATION SIMPLE: RIGHT CHEEK
LOCATION SIMPLE: LEFT FOREARM

## 2017-05-17 ASSESSMENT — LOCATION ZONE DERM
LOCATION ZONE: ARM
LOCATION ZONE: FACE
LOCATION ZONE: TRUNK
LOCATION ZONE: HAND

## 2017-05-17 NOTE — PROCEDURE: MEDICAL PHOTOGRAPHY REVIEW
Review Findings: no new or changing lesions
Number Of Photographs Reviewed: 6
Detail Level: Generalized

## 2017-05-17 NOTE — HPI: EVALUATION OF SKIN LESION(S)
Hpi Title: Evaluation of Skin Lesions
How Severe Are Your Spot(S)?: mild
Have Your Spot(S) Been Treated In The Past?: has not been treated
Location: Left cheek (in situ )
Year Removed: 2014

## 2017-05-17 NOTE — PROCEDURE: OTHER
Detail Level: Simple
Other (Free Text): Refer to Nicole for laser treatments &/or Obagi
Other (Free Text): LN
Note Text (......Xxx Chief Complaint.): This diagnosis correlates with the

## 2017-05-17 NOTE — PROCEDURE: LIQUID NITROGEN
Post-Care Instructions: I reviewed with the patient in detail post-care instructions. Patient is to wear sunprotection, and avoid picking at any of the treated lesions. Pt may apply Vaseline to crusted or scabbing areas. Will re-check at follow up
Duration Of Freeze Thaw-Cycle (Seconds): 5
Detail Level: Simple
Render Post-Care Instructions In Note?: no
Consent: The patient's verbal consent was obtained including but not limited to risks of crusting, scabbing, blistering, scarring, darker or lighter pigmentary change, recurrence, incomplete removal and infection.
Number Of Freeze-Thaw Cycles: 1 freeze-thaw cycle

## 2017-09-15 ENCOUNTER — HOSPITAL ENCOUNTER (OUTPATIENT)
Dept: MAMMOGRAPHY | Age: 57
Discharge: HOME | End: 2017-09-15
Payer: COMMERCIAL

## 2017-09-15 DIAGNOSIS — Z12.31 ENCOUNTER FOR SCREENING MAMMOGRAM FOR BREAST CANCER: ICD-10-CM

## 2017-09-15 PROCEDURE — 77067 SCR MAMMO BI INCL CAD: CPT

## 2017-11-29 ENCOUNTER — APPOINTMENT (RX ONLY)
Dept: URBAN - METROPOLITAN AREA CLINIC 23 | Facility: CLINIC | Age: 57
Setting detail: DERMATOLOGY
End: 2017-11-29

## 2017-11-29 DIAGNOSIS — L81.4 OTHER MELANIN HYPERPIGMENTATION: ICD-10-CM

## 2017-11-29 DIAGNOSIS — L90.5 SCAR CONDITIONS AND FIBROSIS OF SKIN: ICD-10-CM

## 2017-11-29 DIAGNOSIS — D22 MELANOCYTIC NEVI: ICD-10-CM

## 2017-11-29 DIAGNOSIS — Z86.006 PERSONAL HISTORY OF MELANOMA IN-SITU: ICD-10-CM

## 2017-11-29 DIAGNOSIS — I83.9 ASYMPTOMATIC VARICOSE VEINS OF LOWER EXTREMITIES: ICD-10-CM

## 2017-11-29 DIAGNOSIS — Z87.2 PERSONAL HISTORY OF DISEASES OF THE SKIN AND SUBCUTANEOUS TISSUE: ICD-10-CM

## 2017-11-29 PROBLEM — L70.0 ACNE VULGARIS: Status: ACTIVE | Noted: 2017-11-29

## 2017-11-29 PROBLEM — L55.1 SUNBURN OF SECOND DEGREE: Status: ACTIVE | Noted: 2017-11-29

## 2017-11-29 PROBLEM — L29.8 OTHER PRURITUS: Status: ACTIVE | Noted: 2017-11-29

## 2017-11-29 PROBLEM — I83.93 ASYMPTOMATIC VARICOSE VEINS OF BILATERAL LOWER EXTREMITIES: Status: ACTIVE | Noted: 2017-11-29

## 2017-11-29 PROBLEM — Z85.820 PERSONAL HISTORY OF MALIGNANT MELANOMA OF SKIN: Status: ACTIVE | Noted: 2017-11-29

## 2017-11-29 PROBLEM — D22.5 MELANOCYTIC NEVI OF TRUNK: Status: ACTIVE | Noted: 2017-11-29

## 2017-11-29 PROCEDURE — 99214 OFFICE O/P EST MOD 30 MIN: CPT

## 2017-11-29 PROCEDURE — ? REFERRAL

## 2017-11-29 PROCEDURE — ? OTHER

## 2017-11-29 PROCEDURE — ? COUNSELING

## 2017-11-29 ASSESSMENT — LOCATION DETAILED DESCRIPTION DERM
LOCATION DETAILED: RIGHT RIB CAGE
LOCATION DETAILED: LEFT DISTAL PRETIBIAL REGION
LOCATION DETAILED: LEFT SUPERIOR MEDIAL MIDBACK
LOCATION DETAILED: RIGHT MEDIAL SUPERIOR CHEST
LOCATION DETAILED: RIGHT ANTERIOR DISTAL THIGH
LOCATION DETAILED: INFERIOR THORACIC SPINE
LOCATION DETAILED: LEFT MEDIAL PROXIMAL PRETIBIAL REGION
LOCATION DETAILED: LEFT PROXIMAL POSTERIOR UPPER ARM
LOCATION DETAILED: LEFT CENTRAL MALAR CHEEK
LOCATION DETAILED: LEFT DISTAL MEDIAL CALF
LOCATION DETAILED: RIGHT PROXIMAL DORSAL FOREARM
LOCATION DETAILED: LEFT PROXIMAL DORSAL FOREARM

## 2017-11-29 ASSESSMENT — LOCATION ZONE DERM
LOCATION ZONE: TRUNK
LOCATION ZONE: ARM
LOCATION ZONE: LEG
LOCATION ZONE: FACE

## 2017-11-29 ASSESSMENT — LOCATION SIMPLE DESCRIPTION DERM
LOCATION SIMPLE: LEFT CALF
LOCATION SIMPLE: ABDOMEN
LOCATION SIMPLE: RIGHT FOREARM
LOCATION SIMPLE: CHEST
LOCATION SIMPLE: UPPER BACK
LOCATION SIMPLE: LEFT PRETIBIAL REGION
LOCATION SIMPLE: LEFT CHEEK
LOCATION SIMPLE: LEFT POSTERIOR UPPER ARM
LOCATION SIMPLE: LEFT LOWER BACK
LOCATION SIMPLE: RIGHT THIGH
LOCATION SIMPLE: LEFT FOREARM

## 2017-11-29 NOTE — PROCEDURE: OTHER
Note Text (......Xxx Chief Complaint.): This diagnosis correlates with the
Detail Level: Simple
Other (Free Text): Discussed fade cream.

## 2018-03-05 ENCOUNTER — APPOINTMENT (RX ONLY)
Dept: URBAN - METROPOLITAN AREA CLINIC 24 | Facility: CLINIC | Age: 58
Setting detail: DERMATOLOGY
End: 2018-03-05

## 2018-03-05 DIAGNOSIS — Z41.9 ENCOUNTER FOR PROCEDURE FOR PURPOSES OTHER THAN REMEDYING HEALTH STATE, UNSPECIFIED: ICD-10-CM

## 2018-03-05 PROCEDURE — ? COSMETIC CONSULTATION: BROWN SPOTS

## 2018-03-05 ASSESSMENT — LOCATION DETAILED DESCRIPTION DERM
LOCATION DETAILED: RIGHT INFERIOR CENTRAL MALAR CHEEK
LOCATION DETAILED: LEFT INFERIOR MEDIAL MALAR CHEEK

## 2018-03-05 ASSESSMENT — LOCATION SIMPLE DESCRIPTION DERM
LOCATION SIMPLE: LEFT CHEEK
LOCATION SIMPLE: RIGHT CHEEK

## 2018-03-05 ASSESSMENT — LOCATION ZONE DERM: LOCATION ZONE: FACE

## 2018-04-14 ENCOUNTER — HOSPITAL ENCOUNTER (EMERGENCY)
Age: 58
Discharge: HOME | End: 2018-04-14
Attending: EMERGENCY MEDICINE
Payer: COMMERCIAL

## 2018-04-14 VITALS
BODY MASS INDEX: 23.14 KG/M2 | OXYGEN SATURATION: 93 % | HEART RATE: 77 BPM | WEIGHT: 144 LBS | RESPIRATION RATE: 16 BRPM | DIASTOLIC BLOOD PRESSURE: 75 MMHG | HEIGHT: 66 IN | SYSTOLIC BLOOD PRESSURE: 137 MMHG | TEMPERATURE: 99 F

## 2018-04-14 DIAGNOSIS — F43.22 ADJUSTMENT REACTION WITH ANXIETY: ICD-10-CM

## 2018-04-14 DIAGNOSIS — R00.2 PALPITATIONS: Primary | ICD-10-CM

## 2018-04-14 PROCEDURE — 99284 EMERGENCY DEPT VISIT MOD MDM: CPT | Performed by: EMERGENCY MEDICINE

## 2018-04-14 PROCEDURE — 93005 ELECTROCARDIOGRAM TRACING: CPT | Performed by: EMERGENCY MEDICINE

## 2018-04-15 LAB
ATRIAL RATE: 71 BPM
CALCULATED P AXIS, ECG09: 77 DEGREES
CALCULATED R AXIS, ECG10: 51 DEGREES
CALCULATED T AXIS, ECG11: 72 DEGREES
DIAGNOSIS, 93000: NORMAL
P-R INTERVAL, ECG05: 168 MS
Q-T INTERVAL, ECG07: 388 MS
QRS DURATION, ECG06: 84 MS
QTC CALCULATION (BEZET), ECG08: 421 MS
VENTRICULAR RATE, ECG03: 71 BPM

## 2018-04-15 NOTE — ED NOTES
I have reviewed discharge instructions with the patient. The patient verbalized understanding. Patient left ED via Discharge Method: ambulatory to Home with spouse. Opportunity for questions and clarification provided. Patient given 0 scripts. To continue your aftercare when you leave the hospital, you may receive an automated call from our care team to check in on how you are doing. This is a free service and part of our promise to provide the best care and service to meet your aftercare needs.  If you have questions, or wish to unsubscribe from this service please call [phone]. Thank you for Choosing our [facility].

## 2018-04-15 NOTE — DISCHARGE INSTRUCTIONS
Adjustment Disorder: Care Instructions  Your Care Instructions    Adjustment disorder means that you have emotional or behavioral problems because of stress. But your response to the stress is far more severe than a normal response. It is severe enough to affect your work or social life and may cause depression and physical pains and problems. Events that may cause this response can include a divorce, money problems, or starting school or a new job. It might be anything that causes some stress. This disorder is most often a short-term problem. It happens within 3 months of the stressful event or change. If the response lasts longer than 6 months after the event ends, you may have a more serious disorder. Follow-up care is a key part of your treatment and safety. Be sure to make and go to all appointments, and call your doctor if you are having problems. It's also a good idea to know your test results and keep a list of the medicines you take. How can you care for yourself at home? · Go to all counseling sessions. Do not skip any because you are feeling better. · If your doctor prescribed medicines, take them exactly as prescribed. Call your doctor if you think you are having a problem with your medicine. You will get more details on the specific medicines your doctor prescribes. · Discuss the causes of your stress with a good friend or family member. Or you can join a support group for people with similar problems. Talking to others sometimes relieves stress. · Get at least 30 minutes of exercise on most days of the week. Walking is a good choice. You also may want to do other activities, such as running, swimming, cycling, or playing tennis or team sports. Relaxation techniques  Do relaxation exercises 10 to 20 minutes a day. You can play soothing, relaxing music while you do them, if you wish. · Tell others in your house that you are going to do your relaxation exercises.  Ask them not to disturb you.  · Find a comfortable, quiet place. · Lie down on your back, or sit with your back straight. · Focus on your breathing. Make it slow and steady. · Breathe in through your nose. Breathe out through either your nose or mouth. · Breathe deeply, filling up the area between your navel and your rib cage. Breathe so that your belly goes up and down. · Do not hold your breath. · Breathe like this for 5 to 10 minutes. Notice the feeling of calmness throughout your whole body. As you continue to breathe slowly and deeply, relax by doing these next steps for another 5 to 10 minutes:  · Tighten and relax each muscle group in your body. Start at your toes, and work your way up to your head. · Imagine your muscle groups relaxing and getting heavy. · Empty your mind of all thoughts. · Let yourself relax more and more deeply. · Be aware of the state of calmness that surrounds you. · When your relaxation time is over, you can bring yourself back to alertness by moving your fingers and toes. Then move your hands and feet. And then move your entire body. Sometimes people fall asleep during relaxation. But they most often wake up soon. · Always give yourself time to return to full alertness before you drive a car. Wait to do anything that might cause an accident if you are not fully alert. Never play a relaxation tape while you drive a car. When should you call for help? Call 911 anytime you think you may need emergency care. For example, call if:  ? · You feel you cannot stop from hurting yourself or someone else. Keep the numbers for these national suicide hotlines: 6-453-017-TALK (6-045-437-632.660.2023) and 4-608-AZSERSX (9-434.686.6804). If you or someone you know talks about suicide or feeling hopeless, get help right away. ? Watch closely for changes in your health, and be sure to contact your doctor if:  ? · You have new anxiety, or your anxiety gets worse.    ? · You have been feeling sad, depressed, or hopeless or have lost interest in things that you usually enjoy. ? · You do not get better as expected. Where can you learn more? Go to http://norma-megan.info/. Enter [id number] in the search box to learn more about \"Adjustment Disorder: Care Instructions. \"  Current as of: July 26, 2016  Content Version: 11.4  © 7914-9315 [employer]. Care instructions adapted under license by [employer] (which disclaims liability or warranty for this information). If you have questions about a medical condition or this instruction, always ask your healthcare professional. [place] [address] any warranty or liability for your use of this information. Palpitations: Care Instructions  Your Care Instructions    Heart palpitations are the uncomfortable sensation that your heart is beating fast or irregularly. You might feel pounding or fluttering in your chest. It might feel like your heart is skipping a beat. Although palpitations may be caused by a heart problem, they also occur because of stress, fatigue, or use of alcohol, caffeine, or nicotine. Many medicines, including diet pills, antihistamines, decongestants, and some herbal products, can cause heart palpitations. Nearly everyone has palpitations from time to time. Depending on your symptoms, your doctor may need to do more tests to try to find the cause of your palpitations. Follow-up care is a key part of your treatment and safety. Be sure to make and go to all appointments, and call your doctor if you are having problems. It's also a good idea to know your test results and keep a list of the medicines you take. How can you care for yourself at home? · Avoid caffeine, nicotine, and excess alcohol. · Do not take illegal drugs, such as methamphetamines and cocaine. · Do not take weight loss or diet medicines unless you talk with your doctor first.  · Get plenty of sleep. · Do not overeat.   · If you have palpitations again, take deep breaths and try to relax. This may slow a racing heart. · If you start to feel lightheaded, lie down to avoid injuries that might result if you pass out and fall down. · Keep a record of your palpitations and bring it to your next doctor's appointment. Write down:  ¨ The date and time. ¨ Your pulse. (If your heart is beating fast, it may be hard to count your pulse.)  ¨ What you were doing when the palpitations started. ¨ How long the palpitations lasted. ¨ Any other symptoms. · If an activity causes palpitations, slow down or stop. Talk to your doctor before you do that activity again. · Take your medicines exactly as prescribed. Call your doctor if you think you are having a problem with your medicine. When should you call for help? Call 911 anytime you think you may need emergency care. For example, call if:  ? · You passed out (lost consciousness). ? · You have symptoms of a heart attack. These may include:  ¨ Chest pain or pressure, or a strange feeling in the chest.  ¨ Sweating. ¨ Shortness of breath. ¨ Pain, pressure, or a strange feeling in the back, neck, jaw, or upper belly or in one or both shoulders or arms. ¨ Lightheadedness or sudden weakness. ¨ A fast or irregular heartbeat. After you call 911, the  may tell you to chew 1 adult-strength or 2 to 4 low-dose aspirin. Wait for an ambulance. Do not try to drive yourself. ? · You have symptoms of a stroke. These may include:  ¨ Sudden numbness, tingling, weakness, or loss of movement in your face, arm, or leg, especially on only one side of your body. ¨ Sudden vision changes. ¨ Sudden trouble speaking. ¨ Sudden confusion or trouble understanding simple statements. ¨ Sudden problems with walking or balance. ¨ A sudden, severe headache that is different from past headaches.    ?Call your doctor now or seek immediate medical care if:  ? · You have heart palpitations and:  ¨ Are dizzy or lightheaded, or you feel like you may faint. ¨ Have new or increased shortness of breath. ? Watch closely for changes in your health, and be sure to contact your doctor if:  ? · You continue to have heart palpitations. Where can you learn more? Go to http://norma-megan.info/. Enter R508 in the search box to learn more about \"Palpitations: Care Instructions. \"  Current as of: September 21, 2016  Content Version: 11.4  © 7997-7039 [employer]. Care instructions adapted under license by [employer] (which disclaims liability or warranty for this information). If you have questions about a medical condition or this instruction, always ask your healthcare professional. [address] any warranty or liability for your use of this information.

## 2018-04-15 NOTE — ED PROVIDER NOTES
HPI Comments: Patient has had some problems with her voice over the last year, was seen by an ENT 10 days ago and diagnosed with vocal tremors, with a differential to include reflux, MS, Parkinson's. Since that time the patient has perseverated over the possibility of MS or Parkinson's, making urine her more and more anxious as the days go on. She is not scheduled to follow up with the neurologist until mid May. She states she does not have much trouble with sleeping, but does take Ativan 0.5 mg at night as needed for sleep. Tonight the social function she became extremely nervous and felt a bunch of palpitations and came for further evaluation. She denies chest pain, shortness of breath, diaphoresis, lower extremity edema, PND or orthopnea. Patient is a 63 y.o. female presenting with anxiety. The history is provided by the patient and the spouse. Anxiety    This is a new problem. The current episode started more than 2 days ago. The problem has been gradually worsening. Duration of episode(s) is 1 week. The problem occurs daily. The pain is associated with stress. The patient is experiencing no pain. The symptoms are aggravated by stress. Associated symptoms include palpitations. Pertinent negatives include no fever, no irregular heartbeat, no malaise/fatigue, no near-syncope and no shortness of breath. She has tried rest for the symptoms. The treatment provided no relief. Her past medical history is significant for cancer and PE. Her past medical history does not include aneurysm, DM, DVT, HTN or CHF.         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[address]     Melanoma ([address] 11/2014    Left cheek    Prothrombin gene mutation ([address]     Pulmonary embolus ([address] 2009    due to clotting disorder & hormone use    Thyroid disease     hypothyroid       Past Surgical History:   Procedure Laterality Date    HX HERNIA REPAIR      twice as an child 5 and 7  HX SKIN BIOPSY      melanoma removal         Family History:   Problem Relation Age of Onset    Elevated Lipids Mother     Hypertension Mother     Thyroid Disease Mother     Lung Disease Father      Good pastures disease    Stroke Father     Ovarian Cancer Neg Hx     Colon Cancer Neg Hx     Breast Cancer Neg Hx        Social History     Social History    Marital status:      Spouse name: N/A    Number of children: N/A    Years of education: N/A     Occupational History    Not on file. Social History Main Topics    Smoking status: Never Smoker    Smokeless tobacco: Never Used    Alcohol use 0.0 oz/week      Comment: occasional    Drug use: No    Sexual activity: Yes     Partners: Male     Birth control/ protection: IUD      Comment: Mirena Dec 2009     Other Topics Concern    Not on file     Social History Narrative         ALLERGIES: Latex, natural rubber; Adhesive; Lortab [hydrocodone-acetaminophen]; and Tylox [oxycodone-acetaminophen]    Review of Systems   Constitutional: Negative for chills, fever and malaise/fatigue. Respiratory: Negative for shortness of breath. Cardiovascular: Positive for palpitations. Negative for near-syncope. Psychiatric/Behavioral: Positive for sleep disturbance. Negative for self-injury and suicidal ideas. The patient is nervous/anxious. All other systems reviewed and are negative. Vitals:    04/14/18 2115 04/14/18 2123 04/14/18 2138 04/14/18 2153   BP: 165/81 144/72 162/79 154/79   Pulse: 76      Resp:       Temp:       SpO2: 97% 94% 95% 96%   Weight:       Height:                Physical Exam   Constitutional: She is oriented to person, place, and time. She appears well-developed and well-nourished. She appears distressed (mild). HENT:   Head: Normocephalic and atraumatic.    Right Ear: Tympanic membrane and external ear normal.   Left Ear: Tympanic membrane and external ear normal.   Mouth/Throat: Oropharynx is clear and moist.   Eyes: Conjunctivae and EOM are normal. Pupils are equal, round, and reactive to light. Neck: Normal range of motion. Neck supple. No tracheal deviation present. Cardiovascular: Normal rate, regular rhythm, normal heart sounds and intact distal pulses. Exam reveals no gallop and no friction rub. No murmur heard. Pulmonary/Chest: Effort normal and breath sounds normal. No respiratory distress. She has no wheezes. Abdominal: Soft. Bowel sounds are normal. She exhibits no distension and no mass. There is no hepatosplenomegaly. There is no tenderness. There is no rebound and no guarding. Musculoskeletal: Normal range of motion. She exhibits no edema. Lymphadenopathy:     She has no cervical adenopathy. Neurological: She is alert and oriented to person, place, and time. She displays normal reflexes. No cranial nerve deficit. Skin: Skin is warm and dry. No rash noted. She is not diaphoretic. No erythema. Psychiatric: Her speech is normal and behavior is normal. Judgment and thought content normal. Her mood appears anxious. Cognition and memory are normal.   Nursing note and vitals reviewed.        MDM  Number of Diagnoses or Management Options  Adjustment reaction with anxiety: new and does not require workup  Palpitations: new and requires workup     Amount and/or Complexity of Data Reviewed  Obtain history from someone other than the patient: yes  Review and summarize past medical records: yes  Independent visualization of images, tracings, or specimens: yes    Risk of Complications, Morbidity, and/or Mortality  Presenting problems: moderate  Diagnostic procedures: minimal  Management options: low    Patient Progress  Patient progress: improved        ED Course       Procedures

## 2018-07-23 ENCOUNTER — APPOINTMENT (RX ONLY)
Dept: URBAN - METROPOLITAN AREA CLINIC 23 | Facility: CLINIC | Age: 58
Setting detail: DERMATOLOGY
End: 2018-07-23

## 2018-07-23 DIAGNOSIS — Z86.006 PERSONAL HISTORY OF MELANOMA IN-SITU: ICD-10-CM

## 2018-07-23 DIAGNOSIS — Z87.2 PERSONAL HISTORY OF DISEASES OF THE SKIN AND SUBCUTANEOUS TISSUE: ICD-10-CM

## 2018-07-23 DIAGNOSIS — D22 MELANOCYTIC NEVI: ICD-10-CM

## 2018-07-23 DIAGNOSIS — L81.4 OTHER MELANIN HYPERPIGMENTATION: ICD-10-CM

## 2018-07-23 DIAGNOSIS — L82.1 OTHER SEBORRHEIC KERATOSIS: ICD-10-CM

## 2018-07-23 DIAGNOSIS — Z41.9 ENCOUNTER FOR PROCEDURE FOR PURPOSES OTHER THAN REMEDYING HEALTH STATE, UNSPECIFIED: ICD-10-CM

## 2018-07-23 DIAGNOSIS — D18.0 HEMANGIOMA: ICD-10-CM

## 2018-07-23 PROBLEM — D18.01 HEMANGIOMA OF SKIN AND SUBCUTANEOUS TISSUE: Status: ACTIVE | Noted: 2018-07-23

## 2018-07-23 PROBLEM — Z85.820 PERSONAL HISTORY OF MALIGNANT MELANOMA OF SKIN: Status: ACTIVE | Noted: 2018-07-23

## 2018-07-23 PROBLEM — L70.0 ACNE VULGARIS: Status: ACTIVE | Noted: 2018-07-23

## 2018-07-23 PROBLEM — L55.1 SUNBURN OF SECOND DEGREE: Status: ACTIVE | Noted: 2018-07-23

## 2018-07-23 PROBLEM — L85.3 XEROSIS CUTIS: Status: ACTIVE | Noted: 2018-07-23

## 2018-07-23 PROBLEM — D22.5 MELANOCYTIC NEVI OF TRUNK: Status: ACTIVE | Noted: 2018-07-23

## 2018-07-23 PROCEDURE — 99214 OFFICE O/P EST MOD 30 MIN: CPT

## 2018-07-23 PROCEDURE — ? OTHER

## 2018-07-23 PROCEDURE — ? COSMETIC CONSULTATION: FILLERS

## 2018-07-23 PROCEDURE — ? MEDICAL PHOTOGRAPHY REVIEW

## 2018-07-23 PROCEDURE — ? COUNSELING

## 2018-07-23 ASSESSMENT — LOCATION SIMPLE DESCRIPTION DERM
LOCATION SIMPLE: CHEST
LOCATION SIMPLE: LEFT POSTERIOR UPPER ARM
LOCATION SIMPLE: RIGHT SHOULDER
LOCATION SIMPLE: RIGHT UPPER BACK
LOCATION SIMPLE: RIGHT CHEEK
LOCATION SIMPLE: LEFT SHOULDER
LOCATION SIMPLE: RIGHT POSTERIOR UPPER ARM
LOCATION SIMPLE: UPPER BACK
LOCATION SIMPLE: LEFT CHEEK
LOCATION SIMPLE: ABDOMEN
LOCATION SIMPLE: LEFT LOWER BACK

## 2018-07-23 ASSESSMENT — LOCATION ZONE DERM
LOCATION ZONE: TRUNK
LOCATION ZONE: ARM
LOCATION ZONE: FACE

## 2018-07-23 ASSESSMENT — LOCATION DETAILED DESCRIPTION DERM
LOCATION DETAILED: EPIGASTRIC SKIN
LOCATION DETAILED: RIGHT POSTERIOR SHOULDER
LOCATION DETAILED: SUPERIOR THORACIC SPINE
LOCATION DETAILED: LEFT PROXIMAL POSTERIOR UPPER ARM
LOCATION DETAILED: RIGHT SUPERIOR MEDIAL MALAR CHEEK
LOCATION DETAILED: LEFT SUPERIOR MEDIAL MALAR CHEEK
LOCATION DETAILED: LEFT SUPERIOR MEDIAL MIDBACK
LOCATION DETAILED: LEFT CENTRAL MALAR CHEEK
LOCATION DETAILED: RIGHT MEDIAL UPPER BACK
LOCATION DETAILED: RIGHT PROXIMAL POSTERIOR UPPER ARM
LOCATION DETAILED: LEFT POSTERIOR SHOULDER
LOCATION DETAILED: UPPER STERNUM
LOCATION DETAILED: RIGHT RIB CAGE

## 2018-07-23 NOTE — PROCEDURE: OTHER
Note Text (......Xxx Chief Complaint.): This diagnosis correlates with the
Other (Free Text): Discussed referral in [place]
Detail Level: Simple

## 2018-10-12 ENCOUNTER — HOSPITAL ENCOUNTER (OUTPATIENT)
Dept: MAMMOGRAPHY | Age: 58
Discharge: HOME | End: 2018-10-12
Attending: INTERNAL MEDICINE
Payer: COMMERCIAL

## 2018-10-12 DIAGNOSIS — Z12.31 VISIT FOR SCREENING MAMMOGRAM: ICD-10-CM

## 2018-10-12 PROCEDURE — 77063 BREAST TOMOSYNTHESIS BI: CPT

## 2019-04-04 ENCOUNTER — APPOINTMENT (RX ONLY)
Dept: URBAN - METROPOLITAN AREA CLINIC 349 | Facility: CLINIC | Age: 59
Setting detail: DERMATOLOGY
End: 2019-04-04

## 2019-04-04 DIAGNOSIS — L57.8 OTHER SKIN CHANGES DUE TO CHRONIC EXPOSURE TO NONIONIZING RADIATION: ICD-10-CM

## 2019-04-04 DIAGNOSIS — Z71.89 OTHER SPECIFIED COUNSELING: ICD-10-CM

## 2019-04-04 DIAGNOSIS — Z85.820 PERSONAL HISTORY OF MALIGNANT MELANOMA OF SKIN: ICD-10-CM

## 2019-04-04 DIAGNOSIS — D22 MELANOCYTIC NEVI: ICD-10-CM

## 2019-04-04 DIAGNOSIS — Z87.2 PERSONAL HISTORY OF DISEASES OF THE SKIN AND SUBCUTANEOUS TISSUE: ICD-10-CM

## 2019-04-04 PROBLEM — D22.5 MELANOCYTIC NEVI OF TRUNK: Status: ACTIVE | Noted: 2019-04-04

## 2019-04-04 PROCEDURE — ? BODY PHOTOGRAPHY

## 2019-04-04 PROCEDURE — ? COUNSELING

## 2019-04-04 PROCEDURE — ? EDUCATIONAL RESOURCES PROVIDED

## 2019-04-04 PROCEDURE — 99203 OFFICE O/P NEW LOW 30 MIN: CPT

## 2019-04-04 PROCEDURE — ? MEDICAL PHOTOGRAPHY REVIEW

## 2019-04-04 PROCEDURE — ? OTHER

## 2019-04-04 ASSESSMENT — LOCATION DETAILED DESCRIPTION DERM
LOCATION DETAILED: LEFT CENTRAL MALAR CHEEK
LOCATION DETAILED: LEFT MEDIAL UPPER BACK
LOCATION DETAILED: INFERIOR THORACIC SPINE
LOCATION DETAILED: LEFT INFERIOR MEDIAL UPPER BACK
LOCATION DETAILED: RIGHT RIB CAGE
LOCATION DETAILED: RIGHT PROXIMAL LATERAL POSTERIOR UPPER ARM

## 2019-04-04 ASSESSMENT — LOCATION SIMPLE DESCRIPTION DERM
LOCATION SIMPLE: LEFT CHEEK
LOCATION SIMPLE: RIGHT POSTERIOR UPPER ARM
LOCATION SIMPLE: LEFT UPPER BACK
LOCATION SIMPLE: UPPER BACK
LOCATION SIMPLE: ABDOMEN

## 2019-04-04 ASSESSMENT — LOCATION ZONE DERM
LOCATION ZONE: ARM
LOCATION ZONE: TRUNK
LOCATION ZONE: FACE

## 2019-04-04 NOTE — PROCEDURE: BODY PHOTOGRAPHY
Reason For Photography: The patient is obtaining body photography to observe existing suspicious moles and or monitor for the appearance of any new lesions.
Whole Body Statement: The whole body was photographed today.
Was The Entire Body Photographed (Cannot Bill Unless Entire Body Photographed)?: No
Number Of Photographs (Optional- Will Not Render If 0): 3
Detail Level: Generalized
Consent: Written consent obtained, risks reviewed for whole body photography. Patient understands that photograph costs may not be covered by insurance, and patient is ultimately responsible for payment.

## 2019-04-04 NOTE — HPI: MELANOMA F/U (HISTORY OF MALIGNANT MELANOMA)
What Stage Is The Melanoma?: Stage 0
What Is The Reason For Today's Visit?: History of Melanoma
Year Excised?: 10/2014

## 2019-04-04 NOTE — PROCEDURE: MIPS QUALITY
Quality 110: Preventive Care And Screening: Influenza Immunization: Influenza Immunization Administered during Influenza season
Quality 137: Melanoma: Continuity Of Care - Recall System: Recall system not utilized, reason not otherwise specified
Detail Level: Detailed
Quality 397: Melanoma: Reporting: Pathology does not include pT category and/or statement on Breslow depth, ulceration, and pT1 mitotic reporting.
Quality 138: Melanoma: Coordination Of Care: A treatment plan was communicated to the physicians providing continuing care within one month of diagnosis outlining: diagnosis, tumor thickness and a plan for surgery or alternate care.

## 2019-04-04 NOTE — PROCEDURE: OTHER
Detail Level: Detailed
Other (Free Text): Discussed tretinoin, microneedling, and facials.
Note Text (......Xxx Chief Complaint.): This diagnosis correlates with the

## 2019-11-04 PROBLEM — G25.0 ESSENTIAL TREMOR: Status: ACTIVE | Noted: 2019-11-04

## 2019-11-27 ENCOUNTER — HOSPITAL ENCOUNTER (OUTPATIENT)
Dept: MAMMOGRAPHY | Age: 59
Discharge: HOME | End: 2019-11-27
Attending: INTERNAL MEDICINE
Payer: COMMERCIAL

## 2019-11-27 DIAGNOSIS — Z12.31 VISIT FOR SCREENING MAMMOGRAM: ICD-10-CM

## 2019-11-27 PROCEDURE — 77063 BREAST TOMOSYNTHESIS BI: CPT

## 2019-12-18 ENCOUNTER — APPOINTMENT (RX ONLY)
Dept: URBAN - METROPOLITAN AREA CLINIC 349 | Facility: CLINIC | Age: 59
Setting detail: DERMATOLOGY
End: 2019-12-18

## 2019-12-18 DIAGNOSIS — Z71.89 OTHER SPECIFIED COUNSELING: ICD-10-CM

## 2019-12-18 DIAGNOSIS — D22 MELANOCYTIC NEVI: ICD-10-CM

## 2019-12-18 DIAGNOSIS — L57.8 OTHER SKIN CHANGES DUE TO CHRONIC EXPOSURE TO NONIONIZING RADIATION: ICD-10-CM

## 2019-12-18 DIAGNOSIS — L21.8 OTHER SEBORRHEIC DERMATITIS: ICD-10-CM

## 2019-12-18 DIAGNOSIS — Z87.2 PERSONAL HISTORY OF DISEASES OF THE SKIN AND SUBCUTANEOUS TISSUE: ICD-10-CM

## 2019-12-18 DIAGNOSIS — Z85.820 PERSONAL HISTORY OF MALIGNANT MELANOMA OF SKIN: ICD-10-CM

## 2019-12-18 PROBLEM — E03.9 HYPOTHYROIDISM, UNSPECIFIED: Status: ACTIVE | Noted: 2019-12-18

## 2019-12-18 PROBLEM — D22.5 MELANOCYTIC NEVI OF TRUNK: Status: ACTIVE | Noted: 2019-12-18

## 2019-12-18 PROCEDURE — ? MEDICAL PHOTOGRAPHY REVIEW

## 2019-12-18 PROCEDURE — 99214 OFFICE O/P EST MOD 30 MIN: CPT

## 2019-12-18 PROCEDURE — ? OTHER

## 2019-12-18 PROCEDURE — ? COUNSELING

## 2019-12-18 PROCEDURE — ? PRESCRIPTION

## 2019-12-18 PROCEDURE — ? EDUCATIONAL RESOURCES PROVIDED

## 2019-12-18 RX ORDER — FLUOCINOLONE ACETONIDE 0.11 MG/ML
OIL AURICULAR (OTIC)
Qty: 1 | Refills: 2 | Status: ERX | COMMUNITY
Start: 2019-12-18

## 2019-12-18 RX ADMIN — FLUOCINOLONE ACETONIDE: 0.11 OIL AURICULAR (OTIC) at 00:00

## 2019-12-18 ASSESSMENT — SEVERITY ASSESSMENT: HOW SEVERE IS THIS PATIENT'S CONDITION?: ALMOST CLEAR

## 2019-12-18 ASSESSMENT — LOCATION DETAILED DESCRIPTION DERM
LOCATION DETAILED: RIGHT RIB CAGE
LOCATION DETAILED: RIGHT CAVUM CONCHA
LOCATION DETAILED: LEFT CENTRAL MALAR CHEEK
LOCATION DETAILED: RIGHT PROXIMAL LATERAL POSTERIOR UPPER ARM
LOCATION DETAILED: LEFT CAVUM CONCHA
LOCATION DETAILED: LEFT MEDIAL UPPER BACK
LOCATION DETAILED: LEFT INFERIOR LATERAL MALAR CHEEK
LOCATION DETAILED: INFERIOR THORACIC SPINE
LOCATION DETAILED: RIGHT INFERIOR CENTRAL MALAR CHEEK

## 2019-12-18 ASSESSMENT — LOCATION ZONE DERM
LOCATION ZONE: TRUNK
LOCATION ZONE: ARM
LOCATION ZONE: EAR
LOCATION ZONE: FACE

## 2019-12-18 ASSESSMENT — LOCATION SIMPLE DESCRIPTION DERM
LOCATION SIMPLE: UPPER BACK
LOCATION SIMPLE: LEFT EAR
LOCATION SIMPLE: RIGHT EAR
LOCATION SIMPLE: RIGHT POSTERIOR UPPER ARM
LOCATION SIMPLE: LEFT CHEEK
LOCATION SIMPLE: ABDOMEN
LOCATION SIMPLE: LEFT UPPER BACK
LOCATION SIMPLE: RIGHT CHEEK

## 2019-12-18 NOTE — PROCEDURE: MIPS QUALITY
Detail Level: Detailed
Quality 138: Melanoma: Coordination Of Care: A treatment plan was communicated to the physicians providing continuing care within one month of diagnosis outlining: diagnosis, tumor thickness and a plan for surgery or alternate care.
Quality 397: Melanoma: Reporting: Pathology does not include pT category and/or statement on Breslow depth, ulceration, and pT1 mitotic reporting.
Quality 110: Preventive Care And Screening: Influenza Immunization: Influenza Immunization Administered during Influenza season
Quality 137: Melanoma: Continuity Of Care - Recall System: Recall system not utilized, reason not otherwise specified

## 2020-09-30 ENCOUNTER — APPOINTMENT (RX ONLY)
Dept: URBAN - METROPOLITAN AREA CLINIC 349 | Facility: CLINIC | Age: 60
Setting detail: DERMATOLOGY
End: 2020-09-30

## 2020-09-30 DIAGNOSIS — D22 MELANOCYTIC NEVI: ICD-10-CM | Status: STABLE

## 2020-09-30 DIAGNOSIS — L21.8 OTHER SEBORRHEIC DERMATITIS: ICD-10-CM | Status: WELL CONTROLLED

## 2020-09-30 DIAGNOSIS — L73.8 OTHER SPECIFIED FOLLICULAR DISORDERS: ICD-10-CM

## 2020-09-30 DIAGNOSIS — Z85.820 PERSONAL HISTORY OF MALIGNANT MELANOMA OF SKIN: ICD-10-CM

## 2020-09-30 DIAGNOSIS — L82.1 OTHER SEBORRHEIC KERATOSIS: ICD-10-CM

## 2020-09-30 DIAGNOSIS — L82.0 INFLAMED SEBORRHEIC KERATOSIS: ICD-10-CM

## 2020-09-30 DIAGNOSIS — Z87.2 PERSONAL HISTORY OF DISEASES OF THE SKIN AND SUBCUTANEOUS TISSUE: ICD-10-CM

## 2020-09-30 DIAGNOSIS — L57.8 OTHER SKIN CHANGES DUE TO CHRONIC EXPOSURE TO NONIONIZING RADIATION: ICD-10-CM

## 2020-09-30 PROBLEM — D22.5 MELANOCYTIC NEVI OF TRUNK: Status: ACTIVE | Noted: 2020-09-30

## 2020-09-30 PROBLEM — K21.9 GASTRO-ESOPHAGEAL REFLUX DISEASE WITHOUT ESOPHAGITIS: Status: ACTIVE | Noted: 2020-09-30

## 2020-09-30 PROBLEM — L85.3 XEROSIS CUTIS: Status: ACTIVE | Noted: 2020-09-30

## 2020-09-30 PROCEDURE — 17110 DESTRUCTION B9 LES UP TO 14: CPT

## 2020-09-30 PROCEDURE — ? OTHER

## 2020-09-30 PROCEDURE — ? TREATMENT REGIMEN

## 2020-09-30 PROCEDURE — 99214 OFFICE O/P EST MOD 30 MIN: CPT | Mod: 25

## 2020-09-30 PROCEDURE — ? COUNSELING

## 2020-09-30 PROCEDURE — ? MEDICAL PHOTOGRAPHY REVIEW

## 2020-09-30 PROCEDURE — ? LIQUID NITROGEN

## 2020-09-30 ASSESSMENT — LOCATION SIMPLE DESCRIPTION DERM
LOCATION SIMPLE: LEFT NOSE
LOCATION SIMPLE: LEFT FOREHEAD
LOCATION SIMPLE: RIGHT EAR
LOCATION SIMPLE: RIGHT POSTERIOR UPPER ARM
LOCATION SIMPLE: ABDOMEN
LOCATION SIMPLE: RIGHT CHEEK
LOCATION SIMPLE: LEFT EAR
LOCATION SIMPLE: LEFT ANKLE
LOCATION SIMPLE: LEFT UPPER BACK
LOCATION SIMPLE: LEFT CHEEK
LOCATION SIMPLE: UPPER BACK
LOCATION SIMPLE: RIGHT ANKLE

## 2020-09-30 ASSESSMENT — LOCATION DETAILED DESCRIPTION DERM
LOCATION DETAILED: LEFT MEDIAL UPPER BACK
LOCATION DETAILED: LEFT CENTRAL MALAR CHEEK
LOCATION DETAILED: RIGHT RIB CAGE
LOCATION DETAILED: RIGHT CAVUM CONCHA
LOCATION DETAILED: LEFT ANKLE
LOCATION DETAILED: INFERIOR THORACIC SPINE
LOCATION DETAILED: RIGHT PROXIMAL LATERAL POSTERIOR UPPER ARM
LOCATION DETAILED: RIGHT ANTERIOR MEDIAL MALLEOLUS
LOCATION DETAILED: LEFT NASAL SIDEWALL
LOCATION DETAILED: LEFT INFERIOR FOREHEAD
LOCATION DETAILED: RIGHT CENTRAL MALAR CHEEK
LOCATION DETAILED: LEFT CAVUM CONCHA

## 2020-09-30 ASSESSMENT — LOCATION ZONE DERM
LOCATION ZONE: ARM
LOCATION ZONE: FACE
LOCATION ZONE: TRUNK
LOCATION ZONE: NOSE
LOCATION ZONE: EAR
LOCATION ZONE: LEG

## 2020-09-30 NOTE — PROCEDURE: LIQUID NITROGEN
Medical Necessity Information: It is in your best interest to select a reason for this procedure from the list below. All of these items fulfill various CMS LCD requirements except the new and changing color options.
Consent: The patient's consent was obtained including but not limited to risks of crusting, scabbing, blistering, scarring, darker or lighter pigmentary change, recurrence, incomplete removal and infection.
Duration Of Freeze Thaw-Cycle (Seconds): 5-10
Medical Necessity Clause: This procedure was medically necessary because the lesions that were treated were:
Include Z78.9 (Other Specified Conditions Influencing Health Status) As An Associated Diagnosis?: Yes
Detail Level: Detailed
Render Note In Bullet Format When Appropriate: No
Post-Care Instructions: I reviewed with the patient in detail post-care instructions. Patient is to wear sunprotection, and avoid picking at any of the treated lesions. Pt may apply Vaseline to crusted or scabbing areas.
Number Of Freeze-Thaw Cycles: 2 freeze-thaw cycles

## 2020-09-30 NOTE — PROCEDURE: TREATMENT REGIMEN
Continue Regimen: fluocinolone acetonide oil 0.01 % ear drops. Apply to ears twice daily x 2 weeks as needed for flares.\\n\\nPatient declines needing refills at this time.
Detail Level: Detailed

## 2020-09-30 NOTE — PROCEDURE: OTHER
Other (Free Text): Discussed cosmetic Electrodessication.
Other (Free Text): Discussed bleaching cream, tretinoin, IPL, and Morpheus. \\n\\nProvided patient with office ’s card to discussed options.
Detail Level: Detailed
Note Text (......Xxx Chief Complaint.): This diagnosis correlates with the

## 2020-09-30 NOTE — PROCEDURE: MIPS QUALITY
Quality 397: Melanoma: Reporting: Pathology does not include pT category and/or statement on Breslow depth, ulceration, and pT1 mitotic reporting.
Quality 110: Preventive Care And Screening: Influenza Immunization: Influenza Immunization Administered during Influenza season
Quality 137: Melanoma: Continuity Of Care - Recall System: Recall system not utilized, reason not otherwise specified
Detail Level: Detailed
Quality 138: Melanoma: Coordination Of Care: A treatment plan was communicated to the physicians providing continuing care within one month of diagnosis outlining: diagnosis, tumor thickness and a plan for surgery or alternate care.

## 2020-11-03 ENCOUNTER — TRANSCRIBE ORDER (OUTPATIENT)
Dept: SCHEDULING | Age: 60
End: 2020-11-03

## 2020-11-03 DIAGNOSIS — Z12.31 SCREENING MAMMOGRAM FOR HIGH-RISK PATIENT: Primary | ICD-10-CM

## 2020-12-01 ENCOUNTER — HOSPITAL ENCOUNTER (OUTPATIENT)
Dept: MAMMOGRAPHY | Age: 60
Discharge: HOME | End: 2020-12-01
Attending: INTERNAL MEDICINE
Payer: COMMERCIAL

## 2020-12-01 DIAGNOSIS — Z12.31 SCREENING MAMMOGRAM FOR HIGH-RISK PATIENT: ICD-10-CM

## 2020-12-01 PROCEDURE — 77063 BREAST TOMOSYNTHESIS BI: CPT

## 2021-03-30 ENCOUNTER — APPOINTMENT (RX ONLY)
Dept: URBAN - METROPOLITAN AREA CLINIC 349 | Facility: CLINIC | Age: 61
Setting detail: DERMATOLOGY
End: 2021-03-30

## 2021-03-30 DIAGNOSIS — L21.8 OTHER SEBORRHEIC DERMATITIS: ICD-10-CM | Status: WELL CONTROLLED

## 2021-03-30 DIAGNOSIS — Z85.820 PERSONAL HISTORY OF MALIGNANT MELANOMA OF SKIN: ICD-10-CM

## 2021-03-30 DIAGNOSIS — Z87.2 PERSONAL HISTORY OF DISEASES OF THE SKIN AND SUBCUTANEOUS TISSUE: ICD-10-CM

## 2021-03-30 DIAGNOSIS — D22 MELANOCYTIC NEVI: ICD-10-CM | Status: STABLE

## 2021-03-30 DIAGNOSIS — L57.8 OTHER SKIN CHANGES DUE TO CHRONIC EXPOSURE TO NONIONIZING RADIATION: ICD-10-CM

## 2021-03-30 PROBLEM — D22.5 MELANOCYTIC NEVI OF TRUNK: Status: ACTIVE | Noted: 2021-03-30

## 2021-03-30 PROBLEM — L55.1 SUNBURN OF SECOND DEGREE: Status: ACTIVE | Noted: 2021-03-30

## 2021-03-30 PROCEDURE — ? SUNSCREEN RECOMMENDATIONS

## 2021-03-30 PROCEDURE — 99214 OFFICE O/P EST MOD 30 MIN: CPT

## 2021-03-30 PROCEDURE — ? MEDICAL PHOTOGRAPHY REVIEW

## 2021-03-30 PROCEDURE — ? COUNSELING

## 2021-03-30 PROCEDURE — ? PRESCRIPTION MEDICATION MANAGEMENT

## 2021-03-30 ASSESSMENT — LOCATION SIMPLE DESCRIPTION DERM
LOCATION SIMPLE: RIGHT POSTERIOR UPPER ARM
LOCATION SIMPLE: UPPER BACK
LOCATION SIMPLE: LEFT UPPER BACK
LOCATION SIMPLE: RIGHT EAR
LOCATION SIMPLE: LEFT EAR
LOCATION SIMPLE: ABDOMEN
LOCATION SIMPLE: LEFT CHEEK

## 2021-03-30 ASSESSMENT — LOCATION DETAILED DESCRIPTION DERM
LOCATION DETAILED: LEFT CAVUM CONCHA
LOCATION DETAILED: RIGHT PROXIMAL LATERAL POSTERIOR UPPER ARM
LOCATION DETAILED: RIGHT CAVUM CONCHA
LOCATION DETAILED: INFERIOR THORACIC SPINE
LOCATION DETAILED: LEFT MEDIAL UPPER BACK
LOCATION DETAILED: RIGHT RIB CAGE
LOCATION DETAILED: LEFT CENTRAL MALAR CHEEK

## 2021-03-30 ASSESSMENT — LOCATION ZONE DERM
LOCATION ZONE: TRUNK
LOCATION ZONE: FACE
LOCATION ZONE: EAR
LOCATION ZONE: ARM

## 2021-03-30 ASSESSMENT — SEVERITY ASSESSMENT: HOW SEVERE IS THIS PATIENT'S CONDITION?: ALMOST CLEAR

## 2021-03-30 NOTE — PROCEDURE: PRESCRIPTION MEDICATION MANAGEMENT
Detail Level: Zone
Continue Regimen: fluocinolone acetonide oil 0.01 % ear drops. Apply to ears twice daily x 2 weeks as needed for flares.\\n\\nPatient declines needing refills at this time.
Render In Strict Bullet Format?: No

## 2021-03-30 NOTE — PROCEDURE: MIPS QUALITY
Quality 137: Melanoma: Continuity Of Care - Recall System: Recall system not utilized, reason not otherwise specified
Detail Level: Detailed
Quality 138: Melanoma: Coordination Of Care: A treatment plan was communicated to the physicians providing continuing care within one month of diagnosis outlining: diagnosis, tumor thickness and a plan for surgery or alternate care.
Quality 397: Melanoma: Reporting: Pathology does not include pT category and/or statement on Breslow depth, ulceration, and pT1 mitotic reporting.
Quality 110: Preventive Care And Screening: Influenza Immunization: Influenza Immunization Administered during Influenza season

## 2021-11-16 ENCOUNTER — TRANSCRIBE ORDER (OUTPATIENT)
Dept: SCHEDULING | Age: 61
End: 2021-11-16

## 2021-11-16 DIAGNOSIS — Z78.0 ASYMPTOMATIC MENOPAUSE: Primary | ICD-10-CM

## 2022-01-10 ENCOUNTER — TRANSCRIBE ORDER (OUTPATIENT)
Dept: SCHEDULING | Age: 62
End: 2022-01-10

## 2022-01-10 DIAGNOSIS — Z12.31 VISIT FOR SCREENING MAMMOGRAM: Primary | ICD-10-CM

## 2022-01-14 ENCOUNTER — HOSPITAL ENCOUNTER (OUTPATIENT)
Dept: MAMMOGRAPHY | Age: 62
Discharge: HOME | End: 2022-01-14
Attending: INTERNAL MEDICINE
Payer: COMMERCIAL

## 2022-01-14 DIAGNOSIS — Z12.31 VISIT FOR SCREENING MAMMOGRAM: ICD-10-CM

## 2022-01-14 PROCEDURE — 77063 BREAST TOMOSYNTHESIS BI: CPT

## 2022-03-19 PROBLEM — G25.0 ESSENTIAL TREMOR: Status: ACTIVE | Noted: 2019-11-04

## 2022-07-27 RX ORDER — TOPIRAMATE 25 MG/1
TABLET ORAL
Qty: 90 TABLET | Refills: 0 | OUTPATIENT
Start: 2022-07-27

## 2022-07-28 RX ORDER — TOPIRAMATE 25 MG/1
25 TABLET ORAL DAILY
Qty: 90 TABLET | Refills: 0 | Status: SHIPPED | OUTPATIENT
Start: 2022-07-28 | End: 2022-11-02 | Stop reason: SDUPTHER

## 2022-09-09 ENCOUNTER — HOSPITAL ENCOUNTER (OUTPATIENT)
Dept: MAMMOGRAPHY | Age: 62
Discharge: HOME | End: 2022-09-12
Payer: COMMERCIAL

## 2022-09-09 DIAGNOSIS — Z78.0 ASYMPTOMATIC MENOPAUSE: ICD-10-CM

## 2022-09-09 PROCEDURE — 77080 DXA BONE DENSITY AXIAL: CPT

## 2022-10-28 ENCOUNTER — OFFICE VISIT (OUTPATIENT)
Dept: NEUROLOGY | Age: 62
End: 2022-10-28
Payer: COMMERCIAL

## 2022-10-28 VITALS
BODY MASS INDEX: 24.99 KG/M2 | HEART RATE: 78 BPM | DIASTOLIC BLOOD PRESSURE: 81 MMHG | WEIGHT: 150 LBS | SYSTOLIC BLOOD PRESSURE: 141 MMHG | HEIGHT: 65 IN

## 2022-10-28 DIAGNOSIS — G25.0 ESSENTIAL TREMOR: Primary | ICD-10-CM

## 2022-10-28 DIAGNOSIS — Z79.899 ENCOUNTER FOR LONG-TERM (CURRENT) USE OF HIGH-RISK MEDICATION: ICD-10-CM

## 2022-10-28 PROCEDURE — 99215 OFFICE O/P EST HI 40 MIN: CPT | Performed by: PSYCHIATRY & NEUROLOGY

## 2022-10-28 RX ORDER — CETIRIZINE HYDROCHLORIDE 10 MG/1
10 TABLET ORAL DAILY
COMMUNITY

## 2022-10-28 RX ORDER — ASPIRIN 81 MG/1
81 TABLET ORAL DAILY
COMMUNITY

## 2022-10-28 ASSESSMENT — ENCOUNTER SYMPTOMS
ALLERGIC/IMMUNOLOGIC NEGATIVE: 1
GASTROINTESTINAL NEGATIVE: 1
EYES NEGATIVE: 1
RESPIRATORY NEGATIVE: 1

## 2022-10-28 NOTE — PROGRESS NOTES
11/02/22  [address]      Chief Complaint:  Chief Complaint   Patient presents with    Follow-up    Tremors         HPI:   [address] is a 65 y.o., female here for the management of Essential tremor. Currently controlled on topomax 25mg Q day. Tolerating her dose well. She does have more voice tremor. Her balance is stable. Playing a lot of tennis. Patient denies:  dizziness or light headedness,  drooling or swallowing issues  constipation,   hallucinations/ visual illusions or impulse control disorder   recent falls. RBD     RLS    No flowsheet data found. Review of Systems:  Review of Systems   Constitutional: Negative. HENT: Negative. Eyes: Negative. Respiratory: Negative. Cardiovascular: Negative. Gastrointestinal: Negative. Endocrine: Negative. Genitourinary: Negative. Musculoskeletal: Negative. Skin: Negative. Allergic/Immunologic: Negative. Neurological:  Positive for tremors. Hematological: Negative. Psychiatric/Behavioral: Negative.                  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[facility] [address]     Melanoma ([facility] [address] 11/2014    Left cheek    Prothrombin gene mutation [facility])     Pulmonary embolus ([facility] [address] 2009    due to clotting disorder & hormone use    Thyroid disease     hypothyroid       Past Surgical History:   Procedure Laterality Date    HERNIA REPAIR      twice as an child 5 and 7    SKIN BIOPSY      melanoma removal       Family History   Problem Relation Age of Onset    Elevated Lipids Mother     Hypertension Mother     Thyroid Disease Mother     Lung Disease Father         Good pastures disease    Stroke Father     Ovarian Cancer Neg Hx     Colon Cancer Neg Hx     Breast Cancer Neg Hx        Social History     Socioeconomic History    Marital status:      Spouse name: Not on file    Number of children: Not on file    Years of education: Not on file    Highest education level: Not on file   Occupational History    Not on file   Tobacco Use    Smoking status: Never    Smokeless tobacco: Never   Substance and Sexual Activity    Alcohol use: Yes     Alcohol/week: 0.0 standard drinks    Drug use: No    Sexual activity: Not on file     Comment: Mirena Dec 2009   Other Topics Concern    Not on file   Social History Narrative    Not on file     Social Determinants of Health     Financial Resource Strain: Not on file   Food Insecurity: Not on file   Transportation Needs: Not on file   Physical Activity: Not on file   Stress: Not on file   Social Connections: Not on file   Intimate Partner Violence: Not on file   Housing Stability: Not on file       Current Outpatient Medications on File Prior to Visit   Medication Sig Dispense Refill    aspirin 81 MG EC tablet Take 81 mg by mouth daily      cetirizine (ZYRTEC) 10 MG tablet Take 10 mg by mouth daily      IPRATROPIUM BROMIDE IN Inhale into the lungs      escitalopram (LEXAPRO) 5 MG tablet Take by mouth daily      levothyroxine (SYNTHROID) 50 MCG tablet Take 50 mcg by mouth every morning (before breakfast)       No current facility-administered medications on file prior to visit. Allergies   Allergen Reactions    Hydrocodone-Acetaminophen Other (See Comments)     BP dropped, feels lousey    Oxycodone-Acetaminophen Other (See Comments)     hallucinations           Physical Examination    Vitals:    10/28/22 1340   BP: (!) 141/81   Pulse: 78   Weight: 150 lb (68 kg)   Height: 5' 5\" (1.651 m)         General: No acute distress  Psychiatric: well oriented, normal mood and affect  Cardiovascular: no peripheral edema, no JVD, no carotid bruits, RRR  Pulmonary: CTAB  Skin: No rashes, lesions or ulcers  Ext: no C/C/E      Neurologic Exam  Patient oriented to Person, Place and Time. Language and fund of knowledge intact.    CN: pupils equally reactive to light, extraocular movements are intact,.facial sensation  Intact and strength is intact, hearing is normal to finger rubs, palate elevates normally, tongue protrudes midline, shoulder shrug is normal.    Motor:RUE 5/5, RLE 5/5, LUE 5/5, LLE 5/5 ,  normal bulk and tone    Reflexes: Patellar reflexes are +2 , Achilles reflexes are 1+ , toes are downgoing. Sensation: Normal  light touch, temperature and vibration throughout     Cerebellar: FTN, HTS intact    Motor Examination:     Voice tremor 2+      Chin tremor mild       RIGHT LEFT   Tremor at rest 0 0   Postural Tremor of hands 0 0   Action Tremor of hands 0 0   Tremor of Lower extremity 0 0   Open/Close 0 0   Rapid Alternating Movements of Hands 0 0   Finger Taps 0 0                   Foot tapping/LE agility 0 0         Hypophonia 0   Hypomimia 0   Body Bradykinsesia 0                                Assessment/Plan:   Diagnosis Orders   1. Essential tremor        2. Encounter for long-term (current) use of high-risk medication            Stable on current regimen. No changes. I have spent greater than 50% of the patient's 60 minute visit  in counseling for importance of exercise,  medications and education of disease and disease progression. Patient is to continue all other medications as directed by prescribing physicians unless addressed above in plan. Continuation of these medications from today's visit are made based on the patient's report of current medications.      Lula Daniel MD  [facility] Neurology  Director Movement Disorders Program  [address]  Phone: [phone]  Fax: [phone]

## 2022-11-02 RX ORDER — TOPIRAMATE 25 MG/1
25 TABLET ORAL DAILY
Qty: 90 TABLET | Refills: 3 | Status: SHIPPED | OUTPATIENT
Start: 2022-11-02

## 2023-03-07 ENCOUNTER — TRANSCRIBE ORDERS (OUTPATIENT)
Dept: SCHEDULING | Age: 63
End: 2023-03-07

## 2023-03-07 DIAGNOSIS — Z12.31 VISIT FOR SCREENING MAMMOGRAM: Primary | ICD-10-CM

## 2023-03-14 ENCOUNTER — HOSPITAL ENCOUNTER (OUTPATIENT)
Dept: MAMMOGRAPHY | Age: 63
Discharge: HOME | End: 2023-03-17
Payer: COMMERCIAL

## 2023-03-14 DIAGNOSIS — Z12.31 VISIT FOR SCREENING MAMMOGRAM: ICD-10-CM

## 2023-03-14 PROCEDURE — 77063 BREAST TOMOSYNTHESIS BI: CPT

## 2023-10-20 NOTE — PROGRESS NOTES
knowledge intact. CN: pupils equally reactive to light, extraocular movements are intact,.facial sensation  Intact and strength is intact, hearing is normal to finger rubs, palate elevates normally, tongue protrudes midline, shoulder shrug is normal.    Motor:RUE 5/5, RLE 5/5, LUE 5/5, LLE 5/5 ,  normal bulk and tone    Reflexes: Patellar reflexes are +2 , Achilles reflexes are 1+ , toes are downgoing. Sensation: Normal  light touch, temperature and vibration throughout     Cerebellar: FTN, HTS intact    Motor Examination:      Voice tremor 2+      Chin tremor mild       RIGHT LEFT   Tremor at rest 0 0   Postural Tremor of hands 0 0   Action Tremor of hands 0 0   Tremor of Lower extremity 0 0   Open/Close 0 0   Rapid Alternating Movements of Hands 0 0   Finger Taps 0 0                   Foot tapping/LE agility 0 0         Hypophonia 0   Hypomimia 0   Body Bradykinsesia 0                                Assessment/Plan:   Diagnosis Orders   1. Essential tremor            Tremor unchanged and no changes to her regimen. I have spent greater than 50% of the patient's 45 minute visit  in counseling for importance of exercise,  medications and education of disease and disease progression. Patient is to continue all other medications as directed by prescribing physicians unless addressed above in plan. Continuation of these medications from today's visit are made based on the patient's report of current medications.        Lawyer Manuel MONAE  [facility] Neurology  Director Movement Disorders Program  [facility]  [address] [place] [address] [address]. Glend[address] [facility], [address] [facility]  Phone: [phone]  Fax: [phone]

## 2023-10-24 ENCOUNTER — OFFICE VISIT (OUTPATIENT)
Dept: NEUROLOGY | Age: 63
End: 2023-10-24
Payer: COMMERCIAL

## 2023-10-24 VITALS
WEIGHT: 165 LBS | BODY MASS INDEX: 27.46 KG/M2 | HEART RATE: 64 BPM | OXYGEN SATURATION: 95 % | SYSTOLIC BLOOD PRESSURE: 130 MMHG | DIASTOLIC BLOOD PRESSURE: 87 MMHG

## 2023-10-24 DIAGNOSIS — G25.0 ESSENTIAL TREMOR: Primary | ICD-10-CM

## 2023-10-24 PROCEDURE — 99215 OFFICE O/P EST HI 40 MIN: CPT | Performed by: PSYCHIATRY & NEUROLOGY

## 2023-10-24 RX ORDER — ROSUVASTATIN CALCIUM 5 MG/1
TABLET, COATED ORAL
COMMUNITY
Start: 2023-10-02

## 2023-10-24 RX ORDER — LOSARTAN POTASSIUM 25 MG/1
TABLET ORAL
COMMUNITY
Start: 2023-08-03

## 2023-10-24 RX ORDER — TOPIRAMATE 25 MG/1
25 TABLET ORAL DAILY
Qty: 90 TABLET | Refills: 3 | Status: SHIPPED | OUTPATIENT
Start: 2023-10-24

## 2023-10-24 ASSESSMENT — ENCOUNTER SYMPTOMS
CONSTIPATION: 0
BACK PAIN: 0

## 2023-10-24 ASSESSMENT — PATIENT HEALTH QUESTIONNAIRE - PHQ9
2. FEELING DOWN, DEPRESSED OR HOPELESS: 0
DEPRESSION UNABLE TO ASSESS: FUNCTIONAL CAPACITY MOTIVATION LIMITS ACCURACY
SUM OF ALL RESPONSES TO PHQ QUESTIONS 1-9: 0
SUM OF ALL RESPONSES TO PHQ QUESTIONS 1-9: 0
1. LITTLE INTEREST OR PLEASURE IN DOING THINGS: 0
SUM OF ALL RESPONSES TO PHQ QUESTIONS 1-9: 0
SUM OF ALL RESPONSES TO PHQ9 QUESTIONS 1 & 2: 0
SUM OF ALL RESPONSES TO PHQ QUESTIONS 1-9: 0

## 2023-11-02 ENCOUNTER — APPOINTMENT (OUTPATIENT)
Dept: CT IMAGING | Age: 63
End: 2023-11-02
Payer: COMMERCIAL

## 2023-11-02 ENCOUNTER — HOSPITAL ENCOUNTER (INPATIENT)
Age: 63
LOS: 2 days | Discharge: HOME | End: 2023-11-04
Attending: EMERGENCY MEDICINE | Admitting: FAMILY MEDICINE
Payer: COMMERCIAL

## 2023-11-02 DIAGNOSIS — R55 SYNCOPE AND COLLAPSE: ICD-10-CM

## 2023-11-02 DIAGNOSIS — K62.5 RECTAL BLEEDING: Primary | ICD-10-CM

## 2023-11-02 PROBLEM — K92.2 GI BLEED: Status: ACTIVE | Noted: 2023-11-02

## 2023-11-02 PROBLEM — I10 HTN (HYPERTENSION): Status: ACTIVE | Noted: 2023-11-02

## 2023-11-02 PROBLEM — G25.0 ESSENTIAL TREMOR: Status: ACTIVE | Noted: 2019-11-04

## 2023-11-02 LAB
ABO + RH BLD: NORMAL
ALBUMIN SERPL-MCNC: 3.4 G/DL (ref 3.2–4.6)
ALBUMIN/GLOB SERPL: 1.2 (ref 0.4–1.6)
ALP SERPL-CCNC: 99 U/L (ref 50–136)
ALT SERPL-CCNC: 26 U/L (ref 12–65)
ANION GAP SERPL CALC-SCNC: 7 MMOL/L (ref 2–11)
APTT PPP: 21.5 SEC (ref 24.5–34.2)
AST SERPL-CCNC: 18 U/L (ref 15–37)
BASOPHILS # BLD: 0.1 K/UL (ref 0–0.2)
BASOPHILS NFR BLD: 1 % (ref 0–2)
BILIRUB SERPL-MCNC: 0.3 MG/DL (ref 0.2–1.1)
BLOOD GROUP ANTIBODIES SERPL: NORMAL
BUN SERPL-MCNC: 29 MG/DL (ref 8–23)
CALCIUM SERPL-MCNC: 8.5 MG/DL (ref 8.3–10.4)
CHLORIDE SERPL-SCNC: 114 MMOL/L (ref 101–110)
CO2 SERPL-SCNC: 20 MMOL/L (ref 21–32)
CREAT SERPL-MCNC: 1.1 MG/DL (ref 0.6–1)
DIFFERENTIAL METHOD BLD: ABNORMAL
EOSINOPHIL # BLD: 0.5 K/UL (ref 0–0.8)
EOSINOPHIL NFR BLD: 5 % (ref 0.5–7.8)
ERYTHROCYTE [DISTWIDTH] IN BLOOD BY AUTOMATED COUNT: 12.9 % (ref 11.9–14.6)
GLOBULIN SER CALC-MCNC: 2.9 G/DL (ref 2.8–4.5)
GLUCOSE SERPL-MCNC: 152 MG/DL (ref 65–100)
HCT VFR BLD AUTO: 37.3 % (ref 35.8–46.3)
HGB BLD-MCNC: 12.4 G/DL (ref 11.7–15.4)
IMM GRANULOCYTES # BLD AUTO: 0 K/UL (ref 0–0.5)
IMM GRANULOCYTES NFR BLD AUTO: 0 % (ref 0–5)
INR PPP: 0.9
LYMPHOCYTES # BLD: 3.1 K/UL (ref 0.5–4.6)
LYMPHOCYTES NFR BLD: 28 % (ref 13–44)
MCH RBC QN AUTO: 32.5 PG (ref 26.1–32.9)
MCHC RBC AUTO-ENTMCNC: 33.2 G/DL (ref 31.4–35)
MCV RBC AUTO: 97.9 FL (ref 82–102)
MONOCYTES # BLD: 0.7 K/UL (ref 0.1–1.3)
MONOCYTES NFR BLD: 6 % (ref 4–12)
NEUTS SEG # BLD: 6.7 K/UL (ref 1.7–8.2)
NEUTS SEG NFR BLD: 60 % (ref 43–78)
NRBC # BLD: 0 K/UL (ref 0–0.2)
PLATELET # BLD AUTO: 211 K/UL (ref 150–450)
PMV BLD AUTO: 11.2 FL (ref 9.4–12.3)
POTASSIUM SERPL-SCNC: 3.5 MMOL/L (ref 3.5–5.1)
PROT SERPL-MCNC: 6.3 G/DL (ref 6.3–8.2)
PROTHROMBIN TIME: 12.8 SEC (ref 12.6–14.3)
RBC # BLD AUTO: 3.81 M/UL (ref 4.05–5.2)
SODIUM SERPL-SCNC: 141 MMOL/L (ref 133–143)
SPECIMEN EXP DATE BLD: NORMAL
WBC # BLD AUTO: 11 K/UL (ref 4.3–11.1)

## 2023-11-02 PROCEDURE — 85025 COMPLETE CBC W/AUTO DIFF WBC: CPT

## 2023-11-02 PROCEDURE — 86850 RBC ANTIBODY SCREEN: CPT

## 2023-11-02 PROCEDURE — 86900 BLOOD TYPING SEROLOGIC ABO: CPT

## 2023-11-02 PROCEDURE — 74178 CT ABD&PLV WO CNTR FLWD CNTR: CPT

## 2023-11-02 PROCEDURE — 86901 BLOOD TYPING SEROLOGIC RH(D): CPT

## 2023-11-02 PROCEDURE — 85730 THROMBOPLASTIN TIME PARTIAL: CPT

## 2023-11-02 PROCEDURE — 2580000003 HC RX 258: Performed by: EMERGENCY MEDICINE

## 2023-11-02 PROCEDURE — 85610 PROTHROMBIN TIME: CPT

## 2023-11-02 PROCEDURE — 1100000000 HC RM PRIVATE

## 2023-11-02 PROCEDURE — 6360000004 HC RX CONTRAST MEDICATION: Performed by: EMERGENCY MEDICINE

## 2023-11-02 PROCEDURE — 80053 COMPREHEN METABOLIC PANEL: CPT

## 2023-11-02 PROCEDURE — 99285 EMERGENCY DEPT VISIT HI MDM: CPT

## 2023-11-02 RX ORDER — 0.9 % SODIUM CHLORIDE 0.9 %
1000 INTRAVENOUS SOLUTION INTRAVENOUS
Status: COMPLETED | OUTPATIENT
Start: 2023-11-02 | End: 2023-11-02

## 2023-11-02 RX ORDER — ESCITALOPRAM OXALATE 10 MG/1
5 TABLET ORAL NIGHTLY
Status: DISCONTINUED | OUTPATIENT
Start: 2023-11-02 | End: 2023-11-04 | Stop reason: HOSPADM

## 2023-11-02 RX ORDER — SODIUM CHLORIDE 9 MG/ML
INJECTION, SOLUTION INTRAVENOUS PRN
Status: DISCONTINUED | OUTPATIENT
Start: 2023-11-02 | End: 2023-11-04 | Stop reason: HOSPADM

## 2023-11-02 RX ORDER — SODIUM CHLORIDE 0.9 % (FLUSH) 0.9 %
5-40 SYRINGE (ML) INJECTION EVERY 12 HOURS SCHEDULED
Status: DISCONTINUED | OUTPATIENT
Start: 2023-11-02 | End: 2023-11-04 | Stop reason: HOSPADM

## 2023-11-02 RX ORDER — BISACODYL 10 MG
10 SUPPOSITORY, RECTAL RECTAL DAILY PRN
Status: DISCONTINUED | OUTPATIENT
Start: 2023-11-02 | End: 2023-11-04 | Stop reason: HOSPADM

## 2023-11-02 RX ORDER — MAGNESIUM SULFATE IN WATER 40 MG/ML
2000 INJECTION, SOLUTION INTRAVENOUS PRN
Status: DISCONTINUED | OUTPATIENT
Start: 2023-11-02 | End: 2023-11-03

## 2023-11-02 RX ORDER — POTASSIUM CHLORIDE 7.45 MG/ML
10 INJECTION INTRAVENOUS PRN
Status: DISCONTINUED | OUTPATIENT
Start: 2023-11-02 | End: 2023-11-03

## 2023-11-02 RX ORDER — ROSUVASTATIN CALCIUM 10 MG/1
5 TABLET, COATED ORAL NIGHTLY
Status: DISCONTINUED | OUTPATIENT
Start: 2023-11-02 | End: 2023-11-03

## 2023-11-02 RX ORDER — FAMOTIDINE 20 MG/1
10 TABLET, FILM COATED ORAL DAILY PRN
Status: DISCONTINUED | OUTPATIENT
Start: 2023-11-02 | End: 2023-11-04 | Stop reason: HOSPADM

## 2023-11-02 RX ORDER — SODIUM CHLORIDE 0.9 % (FLUSH) 0.9 %
5-40 SYRINGE (ML) INJECTION PRN
Status: DISCONTINUED | OUTPATIENT
Start: 2023-11-02 | End: 2023-11-04 | Stop reason: HOSPADM

## 2023-11-02 RX ORDER — POTASSIUM CHLORIDE 20 MEQ/1
40 TABLET, EXTENDED RELEASE ORAL PRN
Status: DISCONTINUED | OUTPATIENT
Start: 2023-11-02 | End: 2023-11-03

## 2023-11-02 RX ORDER — ONDANSETRON 4 MG/1
4 TABLET, ORALLY DISINTEGRATING ORAL EVERY 8 HOURS PRN
Status: DISCONTINUED | OUTPATIENT
Start: 2023-11-02 | End: 2023-11-04 | Stop reason: HOSPADM

## 2023-11-02 RX ORDER — CETIRIZINE HYDROCHLORIDE 10 MG/1
10 TABLET ORAL DAILY
Status: DISCONTINUED | OUTPATIENT
Start: 2023-11-02 | End: 2023-11-04 | Stop reason: HOSPADM

## 2023-11-02 RX ORDER — TOPIRAMATE 50 MG/1
25 TABLET, FILM COATED ORAL DAILY
Status: DISCONTINUED | OUTPATIENT
Start: 2023-11-03 | End: 2023-11-04 | Stop reason: HOSPADM

## 2023-11-02 RX ORDER — ONDANSETRON 2 MG/ML
4 INJECTION INTRAMUSCULAR; INTRAVENOUS EVERY 6 HOURS PRN
Status: DISCONTINUED | OUTPATIENT
Start: 2023-11-02 | End: 2023-11-04 | Stop reason: HOSPADM

## 2023-11-02 RX ORDER — POLYETHYLENE GLYCOL 3350 17 G/17G
17 POWDER, FOR SOLUTION ORAL DAILY PRN
Status: DISCONTINUED | OUTPATIENT
Start: 2023-11-02 | End: 2023-11-04 | Stop reason: HOSPADM

## 2023-11-02 RX ORDER — SODIUM CHLORIDE 9 MG/ML
INJECTION, SOLUTION INTRAVENOUS CONTINUOUS
Status: DISCONTINUED | OUTPATIENT
Start: 2023-11-02 | End: 2023-11-03

## 2023-11-02 RX ORDER — MAGNESIUM HYDROXIDE/ALUMINUM HYDROXICE/SIMETHICONE 120; 1200; 1200 MG/30ML; MG/30ML; MG/30ML
30 SUSPENSION ORAL EVERY 6 HOURS PRN
Status: DISCONTINUED | OUTPATIENT
Start: 2023-11-02 | End: 2023-11-04 | Stop reason: HOSPADM

## 2023-11-02 RX ORDER — LEVOTHYROXINE SODIUM 0.05 MG/1
50 TABLET ORAL
Status: DISCONTINUED | OUTPATIENT
Start: 2023-11-03 | End: 2023-11-04 | Stop reason: HOSPADM

## 2023-11-02 RX ADMIN — SODIUM CHLORIDE 1000 ML: 9 INJECTION, SOLUTION INTRAVENOUS at 18:34

## 2023-11-02 RX ADMIN — IOPAMIDOL 100 ML: 755 INJECTION, SOLUTION INTRAVENOUS at 19:53

## 2023-11-02 ASSESSMENT — ENCOUNTER SYMPTOMS
ANAL BLEEDING: 1
ABDOMINAL PAIN: 0

## 2023-11-02 ASSESSMENT — PAIN - FUNCTIONAL ASSESSMENT: PAIN_FUNCTIONAL_ASSESSMENT: 0-10

## 2023-11-02 ASSESSMENT — LIFESTYLE VARIABLES
HOW OFTEN DO YOU HAVE A DRINK CONTAINING ALCOHOL: NEVER
HOW MANY STANDARD DRINKS CONTAINING ALCOHOL DO YOU HAVE ON A TYPICAL DAY: PATIENT DOES NOT DRINK

## 2023-11-02 ASSESSMENT — PAIN SCALES - GENERAL: PAINLEVEL_OUTOF10: 0

## 2023-11-02 NOTE — ED TRIAGE NOTES
Pt presents to the ED c/o rectal bleeding. Patient had colonoscopy Tuesday and was advised to come to ED if she  had rectal bleeding. Rectal bleeding started this afternoon, had 6-8 episodes of rectal bleeding. Blood noted on wheelchair when patient transferred to bed. When patient checked in, patient passed out and was brought back to a room.

## 2023-11-02 NOTE — ED PROVIDER NOTES
CBC with Auto Differential   Result Value Ref Range    WBC 11.0 4.3 - 11.1 K/uL    RBC 3.81 (L) 4.05 - 5.2 M/uL    Hemoglobin 12.4 11.7 - 15.4 g/dL    Hematocrit 37.3 35.8 - 46.3 %    MCV 97.9 82 - 102 FL    MCH 32.5 26.1 - 32.9 PG    MCHC 33.2 31.4 - 35.0 g/dL    RDW 12.9 11.9 - 14.6 %    Platelets 253 688 - 390 K/uL    MPV 11.2 9.4 - 12.3 FL    nRBC 0.00 0.0 - 0.2 K/uL    Differential Type AUTOMATED      Neutrophils % 60 43 - 78 %    Lymphocytes % 28 13 - 44 %    Monocytes % 6 4.0 - 12.0 %    Eosinophils % 5 0.5 - 7.8 %    Basophils % 1 0.0 - 2.0 %    Immature Granulocytes 0 0.0 - 5.0 %    Neutrophils Absolute 6.7 1.7 - 8.2 K/UL    Lymphocytes Absolute 3.1 0.5 - 4.6 K/UL    Monocytes Absolute 0.7 0.1 - 1.3 K/UL    Eosinophils Absolute 0.5 0.0 - 0.8 K/UL    Basophils Absolute 0.1 0.0 - 0.2 K/UL    Absolute Immature Granulocyte 0.0 0.0 - 0.5 K/UL   Comprehensive Metabolic Panel   Result Value Ref Range    Sodium 141 133 - 143 mmol/L    Potassium 3.5 3.5 - 5.1 mmol/L    Chloride 114 (H) 101 - 110 mmol/L    CO2 20 (L) 21 - 32 mmol/L    Anion Gap 7 2 - 11 mmol/L    Glucose 152 (H) 65 - 100 mg/dL    BUN 29 (H) 8 - 23 MG/DL    Creatinine 1.10 (H) 0.6 - 1.0 MG/DL    Est, Glom Filt Rate 57 (L) >60 ml/min/1.73m2    Calcium 8.5 8.3 - 10.4 MG/DL    Total Bilirubin 0.3 0.2 - 1.1 MG/DL    ALT 26 12 - 65 U/L    AST 18 15 - 37 U/L    Alk Phosphatase 99 50 - 136 U/L    Total Protein 6.3 6.3 - 8.2 g/dL    Albumin 3.4 3.2 - 4.6 g/dL    Globulin 2.9 2.8 - 4.5 g/dL    Albumin/Globulin Ratio 1.2 0.4 - 1.6     Protime-INR   Result Value Ref Range    Protime 12.8 12.6 - 14.3 sec    INR 0.9     APTT   Result Value Ref Range    PTT 21.5 (L) 24.5 - 34.2 SEC        CT ABDOMEN PELVIS GI BLEED Additional Contrast? None    (Results Pending)                     Voice dictation software was used during the making of this note. This software is not perfect and grammatical and other typographical errors may be present.   This note has not been

## 2023-11-03 PROBLEM — G43.909 MIGRAINE: Status: ACTIVE | Noted: 2023-11-03

## 2023-11-03 PROBLEM — F39 MOOD DISORDER (HCC): Status: ACTIVE | Noted: 2023-11-03

## 2023-11-03 PROBLEM — D68.51 FACTOR 5 LEIDEN MUTATION, HETEROZYGOUS (HCC): Status: ACTIVE | Noted: 2023-11-03

## 2023-11-03 LAB
ANION GAP SERPL CALC-SCNC: 6 MMOL/L (ref 2–11)
BUN SERPL-MCNC: 19 MG/DL (ref 8–23)
CALCIUM SERPL-MCNC: 7.7 MG/DL (ref 8.3–10.4)
CHLORIDE SERPL-SCNC: 116 MMOL/L (ref 101–110)
CO2 SERPL-SCNC: 21 MMOL/L (ref 21–32)
CREAT SERPL-MCNC: 0.9 MG/DL (ref 0.6–1)
GLUCOSE BLD STRIP.AUTO-MCNC: 103 MG/DL (ref 65–100)
GLUCOSE SERPL-MCNC: 99 MG/DL (ref 65–100)
HCT VFR BLD AUTO: 29.7 % (ref 35.8–46.3)
HCT VFR BLD AUTO: 30.2 % (ref 35.8–46.3)
HCT VFR BLD AUTO: 31.5 % (ref 35.8–46.3)
HCT VFR BLD AUTO: 32.4 % (ref 35.8–46.3)
HGB BLD-MCNC: 10.1 G/DL (ref 11.7–15.4)
HGB BLD-MCNC: 10.1 G/DL (ref 11.7–15.4)
HGB BLD-MCNC: 10.5 G/DL (ref 11.7–15.4)
HGB BLD-MCNC: 11 G/DL (ref 11.7–15.4)
POTASSIUM SERPL-SCNC: 3.7 MMOL/L (ref 3.5–5.1)
SERVICE CMNT-IMP: ABNORMAL
SODIUM SERPL-SCNC: 143 MMOL/L (ref 133–143)

## 2023-11-03 PROCEDURE — 85014 HEMATOCRIT: CPT

## 2023-11-03 PROCEDURE — 85018 HEMOGLOBIN: CPT

## 2023-11-03 PROCEDURE — 1100000003 HC PRIVATE W/ TELEMETRY

## 2023-11-03 PROCEDURE — 80048 BASIC METABOLIC PNL TOTAL CA: CPT

## 2023-11-03 PROCEDURE — 2580000003 HC RX 258: Performed by: FAMILY MEDICINE

## 2023-11-03 PROCEDURE — 99252 IP/OBS CONSLTJ NEW/EST SF 35: CPT | Performed by: INTERNAL MEDICINE

## 2023-11-03 PROCEDURE — 6370000000 HC RX 637 (ALT 250 FOR IP): Performed by: FAMILY MEDICINE

## 2023-11-03 PROCEDURE — 82962 GLUCOSE BLOOD TEST: CPT

## 2023-11-03 PROCEDURE — 36415 COLL VENOUS BLD VENIPUNCTURE: CPT

## 2023-11-03 RX ORDER — ROSUVASTATIN CALCIUM 10 MG/1
5 TABLET, COATED ORAL DAILY
Status: DISCONTINUED | OUTPATIENT
Start: 2023-11-04 | End: 2023-11-04 | Stop reason: HOSPADM

## 2023-11-03 RX ADMIN — ESCITALOPRAM OXALATE 5 MG: 10 TABLET ORAL at 21:55

## 2023-11-03 RX ADMIN — TOPIRAMATE 25 MG: 50 TABLET, FILM COATED ORAL at 09:35

## 2023-11-03 RX ADMIN — LEVOTHYROXINE SODIUM 50 MCG: 0.05 TABLET ORAL at 06:19

## 2023-11-03 RX ADMIN — CETIRIZINE HYDROCHLORIDE 10 MG: 10 TABLET, FILM COATED ORAL at 09:35

## 2023-11-03 RX ADMIN — CETIRIZINE HYDROCHLORIDE 10 MG: 10 TABLET, FILM COATED ORAL at 01:05

## 2023-11-03 RX ADMIN — SODIUM CHLORIDE, PRESERVATIVE FREE 10 ML: 5 INJECTION INTRAVENOUS at 01:09

## 2023-11-03 RX ADMIN — ESCITALOPRAM OXALATE 5 MG: 10 TABLET ORAL at 01:05

## 2023-11-03 RX ADMIN — SODIUM CHLORIDE, PRESERVATIVE FREE 10 ML: 5 INJECTION INTRAVENOUS at 21:56

## 2023-11-03 RX ADMIN — SODIUM CHLORIDE: 9 INJECTION, SOLUTION INTRAVENOUS at 00:41

## 2023-11-03 ASSESSMENT — PAIN SCALES - GENERAL
PAINLEVEL_OUTOF10: 0
PAINLEVEL_OUTOF10: 0

## 2023-11-03 NOTE — PROGRESS NOTES
.. TRANSFER - IN REPORT:    Verbal report received from [employer], [address]  being received from ED for routine progression of patient care      Report consisted of patient's Situation, Background, Assessment and   Recommendations(SBAR). Information from the following report(s) Nurse Handoff Report, ED Encounter Summary, ED SBAR, MAR, Recent Results, and Neuro Assessment was reviewed with the receiving nurse. Opportunity for questions and clarification was provided. Assessment completed upon patient's arrival to unit and care assumed.

## 2023-11-03 NOTE — CONSULTS
Consult Note            Date:11/3/2023        Patient Name:Sharon Qiu     YOB: 1960     Age:63 y.o. Reason for consult: Rectal bleeding after colonoscopy earlier this week with polypectomy. 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 She had a colonoscopy performed 3 days ago at another facility in which she reports several polyps were removed. Yesterday she developed rectal bleeding with clots and presented to the ED. She denies any use of any anticoagulants or antiplatelets. She denies abdominal pain. She denies any chest pain or dyspnea. Her hemoglobin on presentation was 11 from a baseline of 12 which was normal.  No further reports of rectal bleeding while here in the ED or hospital.    I spoke to her gastroenterologist Dr. Cheko Coronado who states that he did perform her colonoscopy 3 days ago and polyps were removed but he does not have access to her report at this time. He recommended that I contact that facility to get the report. She does not have access to the report either on her smart phone. Past Medical History     Past Medical History:   Diagnosis Date    Cancer ([address]) 2014    melanoma    Factor II deficiency ([address])     Melanoma ([address] [facility]) 11/2014    Left cheek    Prothrombin gene mutation [facility])     Pulmonary embolus ([address] [facility]) 2009    due to clotting disorder & hormone use    Thyroid disease     hypothyroid        Past Surgical History     Past Surgical History:   Procedure Laterality Date    HERNIA REPAIR      twice as an child 5 and 7    SKIN BIOPSY      melanoma removal        Medications     Prior to Admission medications    Medication Sig Start Date End Date Taking?  Authorizing Provider   rosuvastatin (CRESTOR) 5 MG tablet  10/2/23   Pierce Wilkinson MD   losartan (COZAAR) 25 MG tablet  8/3/23   Pierce Wilkinson MD

## 2023-11-03 NOTE — PROGRESS NOTES
Hospitalist Progress Note   Admit Date:  2023  6:09 PM   Name:  Washington   Age:  59 y.o. Sex:  female  :  1960   MRN:  [id number]   Room:  [facility]/    Presenting/Chief Complaint: Rectal Bleeding     Reason(s) for Admission: Syncope and collapse [R55]  Rectal bleeding [K62.5]  GI bleed [K92.2]     Hospital Course:   Washington is a 59 y.o. female with medical history of Hx of HTN, HLD, depression, hypothyroidism, dysphagia, gastric ulcer, GERD, hemorrhoids, essential tremor, clotting disorder on aspirin every other day (DVT and PE over 10 years ago) presenting with rectal bleeding. colonoscopy with multiple polypectomies by Dr. Marshall Gruber two days ago. Denies anticoagulation but does take aspirin every other day due to  factor V Leyden mutation. While waiting for a room, had a syncopal episode in our ER. CBC stable, hgb 12.4 trended down to 10.1 overnight. No active bleed on CT abdomen pelvis. Subjective & 24hr Events:   Sitting up in bed. Family member at the bedside. Has had a few more bloody BM today with passage of large clots. Hgb stable. Pt agreeable to stay overnight for obs and monitoring of hgb given persistent bleeding. Assessment & Plan:     Principal Problem:  GI bleed  Syncope  S/p colonoscopy with polypectomy 3 days prior to arrival  Serial hemoglobins, hemoglobin trended from 12.4 to 11 to 10.5. Suspect some of it is dilutional however she continues to pass blood clots  Vital signs stable  GI on board, appreciate recs  We will likely monitor overnight and discharge in the a.m. pending stability of hemoglobin improvement rectal bleeding  Suspect syncope secondary to bleed    Active Problems:    Thyroid disease  Continue synthroid 50 mcg    HTN (hypertension)  Holding losartan for now    Factor V leiden  Hold ASA until follow up with PCP    Migraine  Resume topamax    Mood disorder  Resume lexapro        PT/OT evals and PPD needed/ordered?   NA  Diet: Creatinine 1.10 (H) 0.6 - 1.0 MG/DL    Est, Glom Filt Rate 57 (L) >60 ml/min/1.73m2    Calcium 8.5 8.3 - 10.4 MG/DL    Total Bilirubin 0.3 0.2 - 1.1 MG/DL    ALT 26 12 - 65 U/L    AST 18 15 - 37 U/L    Alk Phosphatase 99 50 - 136 U/L    Total Protein 6.3 6.3 - 8.2 g/dL    Albumin 3.4 3.2 - 4.6 g/dL    Globulin 2.9 2.8 - 4.5 g/dL    Albumin/Globulin Ratio 1.2 0.4 - 1.6     Protime-INR    Collection Time: 11/02/23  6:23 PM   Result Value Ref Range    Protime 12.8 12.6 - 14.3 sec    INR 0.9     APTT    Collection Time: 11/02/23  6:23 PM   Result Value Ref Range    PTT 21.5 (L) 24.5 - 34.2 SEC   Hemoglobin and Hematocrit    Collection Time: 11/03/23 12:29 AM   Result Value Ref Range    Hemoglobin 11.0 (L) 11.7 - 15.4 g/dL    Hematocrit 32.4 (L) 35.8 - 46.3 %   Basic Metabolic Panel w/ Reflex to MG    Collection Time: 11/03/23  6:15 AM   Result Value Ref Range    Sodium 143 133 - 143 mmol/L    Potassium 3.7 3.5 - 5.1 mmol/L    Chloride 116 (H) 101 - 110 mmol/L    CO2 21 21 - 32 mmol/L    Anion Gap 6 2 - 11 mmol/L    Glucose 99 65 - 100 mg/dL    BUN 19 8 - 23 MG/DL    Creatinine 0.90 0.6 - 1.0 MG/DL    Est, Glom Filt Rate >60 >60 ml/min/1.73m2    Calcium 7.7 (L) 8.3 - 10.4 MG/DL   Hemoglobin and Hematocrit    Collection Time: 11/03/23  6:15 AM   Result Value Ref Range    Hemoglobin 10.1 (L) 11.7 - 15.4 g/dL    Hematocrit 29.7 (L) 35.8 - 46.3 %   POCT Glucose    Collection Time: 11/03/23  7:41 AM   Result Value Ref Range    POC Glucose 103 (H) 65 - 100 mg/dL    Performed by: [facility]    Hemoglobin and Hematocrit    Collection Time: 11/03/23  1:47 PM   Result Value Ref Range    Hemoglobin 10.5 (L) 11.7 - 15.4 g/dL    Hematocrit 31.5 (L) 35.8 - 46.3 %       Current Meds:  Current Facility-Administered Medications   Medication Dose Route Frequency    [START ON 11/4/2023] rosuvastatin (CRESTOR) tablet 5 mg  5 mg Oral Daily    cetirizine (ZYRTEC) tablet 10 mg  10 mg Oral Daily    escitalopram (LEXAPRO) tablet 5 mg  5

## 2023-11-03 NOTE — ED NOTES
TRANSFER - OUT REPORT:    Verbal report given to MATEO Mleendez on Shima  being transferred to [phone] for routine progression of patient care       Report consisted of patient's Situation, Background, Assessment and   Recommendations(SBAR). Information from the following report(s) Nurse Handoff Report was reviewed with the receiving nurse. [place] Fall Assessment:    Presents to emergency department  because of falls (Syncope, seizure, or loss of consciousness): No  Age > 70: No  Altered Mental Status, Intoxication with alcohol or substance confusion (Disorientation, impaired judgment, poor safety awaremess, or inability to follow instructions): No  Impaired Mobility: Ambulates or transfers with assistive devices or assistance; Unable to ambulate or transer.: No  Nursing Judgement: No          Lines:   Peripheral IV 11/02/23 Right Antecubital (Active)        Opportunity for questions and clarification was provided.       Patient transported with:  Amada Don RN  11/02/23 5905

## 2023-11-03 NOTE — ACP (ADVANCE CARE PLANNING)
[facility]  At the heart of better care     Advance Care Planning   Admit Date:  2023  6:09 PM   Name:  Washington   Age:  59 y.o. Sex:  female  :  1960   MRN:  [id number]   Room:  [address] [place] [address] has capacity to make her own decisions:   Yes    Patient / surrogate decision-maker directed code status:  Full Code    Patient or surrogate consented to discussion of the current conditions, workup, management plans, prognosis, and the risk for further deterioration. Time spent: 17 minutes in direct discussion.       Signed:  Herman Cook DO

## 2023-11-03 NOTE — H&P
Hospitalist History and Physical   Admit Date:  2023  6:09 PM   Name:  Washington   Age:  59 y.o. Sex:  female  :  1960   MRN:  [id number]   Room:  ER02/02    Presenting/Chief Complaint: Rectal Bleeding     Reason(s) for Admission: GI bleed [K92.2]     History of Present Illness:   Washington is a 59 y.o. female who presented to the ED for cc bright red rectal bleeding since this AM X 8 episodes. Recently had a colonoscopy with multiple polypectomies by Dr. Gisella Felipe two days ago. Denies anticoagulation but does take aspirin every other day due to genetic clotting disorder. While waiting for a room, had a syncopal episode in our ER. No further bleeding since her arrival.     Hx of HTN, HLD, depression, hypothyroidism, dysphagia, gastric ulcer, GERD, hemorrhoids, essential tremor, clotting disorder (DVT and PE over 10 years ago)    CT abdomen pelvis GI bleed scan pending  Assessment & Plan: Active Problems:    GI bleed - Type and screen. Trend Hg. She is willing to receive blood if need. CT GI bleeding scan pending. IV fluids. Clears today and NPO past midnight. Consult GI to see in AM. Holding her ASA. HTN - Holding BP meds since with volume loss. HLD - statin    Essential tremor - topamax    Depression - Lexapro    Hypothyroidism - Synthroid       PT/OT evals and PPD needed/ordered? No  Diet: ADULT DIET; Clear Liquid  Diet NPO  VTE prophylaxis: SCD's   Code status: Full Code      Non-peripheral Lines and Tubes (if present):             Hospital Problems:  Principal Problem:    GI bleed  Active Problems:    Essential tremor    Thyroid disease    HTN (hypertension)  Resolved Problems:    * No resolved hospital problems.  *      Past History:     Past Medical History:   Diagnosis Date    Cancer ([address])     melanoma    Factor II deficiency ([address])     Melanoma ([address]) 2014    Left cheek    Prothrombin gene mutation [facility])     Pulmonary embolus ([address] [facility])     due to clotting

## 2023-11-03 NOTE — CONSENT
Informed Consent for Blood Component Transfusion Note    I have discussed with the patient the rationale for blood component transfusion; its benefits in treating or preventing fatigue, organ damage, or death; and its risk which includes mild transfusion reactions, rare risk of blood borne infection, or more serious but rare reactions. I have discussed the alternatives to transfusion, including the risk and consequences of not receiving transfusion. The patient had an opportunity to ask questions and had agreed to proceed with transfusion of blood components.     Electronically signed by Jami Quispe DO on 11/2/23 at 8:38 PM EDT

## 2023-11-03 NOTE — PROGRESS NOTES
I spoke to Dr. Bria Reid who states he removed several polyps throughout the colon. None were remarkably large. Her hemoglobin has since declined to 10.5. Again, no further rectal bleeding while in hospital.    Assessment and plan:  Likely a post polypectomy bleed. Seems to have stopped as they usually do on their own. She is hemodynamically stable. No abdominal pain. Keep an additional night to ensure stability of her hemoglobin. Okay to DC home if hemoglobin remains stable. Case discussed with the hospitalist and Dr. Bria Reid and patient. Dr. Vasquez Ceron will be taking over GI coverage tomorrow. Please contact him directly if there are signs for rebleeding.

## 2023-11-03 NOTE — CARE COORDINATION
Met with Ms. Heri Hernandez at bedside for initial CM assessment. Patient is alert and oriented x4. Demographics and PCP verified. Ms. Heri Hernandez is  and lives alone in a one level home with 2 steps to enter. She was independent with mobility and ADLs at most recent baseline and used no DME or services. She still drives in the community. Ms. Heri Hernandez plans to return home at discharge and anticipates no needs. CM will continue to follow. 11/03/23 0912   Service Assessment   Patient Orientation Alert and Oriented;Person;Place;Situation   Cognition Alert   History Provided By Patient   Primary Caregiver Self   Accompanied By/Relationship N/A   Support Systems Children;Family Members;Friends/Neighbors   Patient's Healthcare Decision Maker is: Patient Declined (Legal Next of Kin Remains as Decision Maker)   PCP Verified by CM Yes   Last Visit to PCP Within last 3 months   Prior Functional Level Independent in ADLs/IADLs   Current Functional Level Independent in ADLs/IADLs   Can patient return to prior living arrangement Yes   Ability to make needs known: Good   Family able to assist with home care needs: Yes   Would you like for me to discuss the discharge plan with any other family members/significant others, and if so, who?  Yes  (Son-Juan)   Financial Resources None   Community Resources None   Social/Functional History   Lives With Alone   Type of [address] [place] [address]  One level   Home Access Stairs to enter without rails   Entrance Stairs - Number of Steps 2   Bathroom Shower/Tub Walk-in shower   Bathroom Toilet Standard   Bathroom Equipment Grab bars in shower;Built-in [address]   Homemaking Assistance Independent   Ambulation Assistance Independent   Transfer Assistance Independent   Active  Yes   Mode of Transportation Car   Occupation Unemployed   Discharge Planning   Type of [address]

## 2023-11-04 VITALS
OXYGEN SATURATION: 97 % | SYSTOLIC BLOOD PRESSURE: 132 MMHG | HEART RATE: 63 BPM | HEIGHT: 65 IN | BODY MASS INDEX: 25.83 KG/M2 | WEIGHT: 155 LBS | DIASTOLIC BLOOD PRESSURE: 73 MMHG | RESPIRATION RATE: 18 BRPM | TEMPERATURE: 98.6 F

## 2023-11-04 LAB
ANION GAP SERPL CALC-SCNC: 4 MMOL/L (ref 2–11)
BUN SERPL-MCNC: 14 MG/DL (ref 8–23)
CALCIUM SERPL-MCNC: 8.3 MG/DL (ref 8.3–10.4)
CHLORIDE SERPL-SCNC: 116 MMOL/L (ref 101–110)
CO2 SERPL-SCNC: 23 MMOL/L (ref 21–32)
CREAT SERPL-MCNC: 0.9 MG/DL (ref 0.6–1)
GLUCOSE BLD STRIP.AUTO-MCNC: 108 MG/DL (ref 65–100)
GLUCOSE BLD STRIP.AUTO-MCNC: 169 MG/DL (ref 65–100)
GLUCOSE SERPL-MCNC: 104 MG/DL (ref 65–100)
HCT VFR BLD AUTO: 30.5 % (ref 35.8–46.3)
HCT VFR BLD AUTO: 31.4 % (ref 35.8–46.3)
HCT VFR BLD AUTO: 32.8 % (ref 35.8–46.3)
HGB BLD-MCNC: 10.1 G/DL (ref 11.7–15.4)
HGB BLD-MCNC: 10.5 G/DL (ref 11.7–15.4)
HGB BLD-MCNC: 11 G/DL (ref 11.7–15.4)
POTASSIUM SERPL-SCNC: 3.8 MMOL/L (ref 3.5–5.1)
SERVICE CMNT-IMP: ABNORMAL
SERVICE CMNT-IMP: ABNORMAL
SODIUM SERPL-SCNC: 143 MMOL/L (ref 133–143)

## 2023-11-04 PROCEDURE — 80048 BASIC METABOLIC PNL TOTAL CA: CPT

## 2023-11-04 PROCEDURE — 85014 HEMATOCRIT: CPT

## 2023-11-04 PROCEDURE — 36415 COLL VENOUS BLD VENIPUNCTURE: CPT

## 2023-11-04 PROCEDURE — 6370000000 HC RX 637 (ALT 250 FOR IP): Performed by: FAMILY MEDICINE

## 2023-11-04 PROCEDURE — 2580000003 HC RX 258: Performed by: FAMILY MEDICINE

## 2023-11-04 PROCEDURE — 85018 HEMOGLOBIN: CPT

## 2023-11-04 RX ORDER — FERROUS SULFATE 325(65) MG
325 TABLET ORAL 2 TIMES DAILY
Qty: 180 TABLET | Refills: 1 | Status: SHIPPED | OUTPATIENT
Start: 2023-11-04

## 2023-11-04 RX ORDER — LOSARTAN POTASSIUM 25 MG/1
25 TABLET ORAL DAILY
Qty: 30 TABLET | Refills: 3 | Status: SHIPPED | OUTPATIENT
Start: 2023-11-06

## 2023-11-04 RX ORDER — ASPIRIN 81 MG/1
81 TABLET ORAL DAILY
Qty: 30 TABLET | Refills: 3 | Status: SHIPPED | OUTPATIENT
Start: 2023-11-04

## 2023-11-04 RX ADMIN — TOPIRAMATE 25 MG: 50 TABLET, FILM COATED ORAL at 09:44

## 2023-11-04 RX ADMIN — ROSUVASTATIN 5 MG: 10 TABLET, FILM COATED ORAL at 09:45

## 2023-11-04 RX ADMIN — CETIRIZINE HYDROCHLORIDE 10 MG: 10 TABLET, FILM COATED ORAL at 09:45

## 2023-11-04 RX ADMIN — SODIUM CHLORIDE, PRESERVATIVE FREE 10 ML: 5 INJECTION INTRAVENOUS at 09:46

## 2023-11-04 RX ADMIN — LEVOTHYROXINE SODIUM 50 MCG: 0.05 TABLET ORAL at 07:00

## 2023-11-04 NOTE — PROGRESS NOTES
Pt discharged home. Pt's friend took her home. Hourly rounds completed. Pt's I.V removed without complications. Discharge paperwork and instructions handed to pt and reviewed with pt. Opportunity for questions provided. Pt verbalized understanding. Pt denies needs at this time. All needs met at this time.

## 2023-11-04 NOTE — CARE COORDINATION
Patient has discahrge orders for today. CM has reviewed patient medical chart/ discharge summary and reviewed weekend report. Patient has no needs identified at this time. Family will transport patient home. Patient has met all treatment goals / milestones. CM will continue to follow and remain available for any needs, concerns or questions that may arise. 11/03/23 0912   Service Assessment   Patient Orientation Alert and Oriented;Person;Place;Situation   Cognition Alert   History Provided By Patient   Primary Caregiver Self   Accompanied By/Relationship N/A   Support Systems Children;Family Members;Friends/Neighbors   Patient's Healthcare Decision Maker is: Patient Declined (Legal Next of Kin Remains as Decision Maker)   PCP Verified by CM Yes   Last Visit to PCP Within last 3 months   Prior Functional Level Independent in ADLs/IADLs   Current Functional Level Independent in ADLs/IADLs   Can patient return to prior living arrangement Yes   Ability to make needs known: Good   Family able to assist with home care needs: Yes   Would you like for me to discuss the discharge plan with any other family members/significant others, and if so, who?  Yes  (Son-Juan)   Financial Resources None   Community Resources None   Social/Functional History   Lives With Alone   Type of [address] [place] [address] One level   Home Access Stairs to enter without rails   Entrance Stairs - Number of Steps 2   Bathroom Shower/Tub Walk-in shower   Bathroom Toilet Standard   Bathroom Equipment Grab bars in shower;Built-in shower seat   [address]   Homemaking Assistance Independent   Ambulation Assistance Independent   Transfer Assistance Independent   Active  Yes   Mode of Transportation Car   Occupation Unemployed   Discharge Planning   Type of [address] Prior To Admission None   Potential

## 2023-11-04 NOTE — PROGRESS NOTES
Patient is resting quietly in bed. Hourly rounds completed. All stated needs met. Bed L/L, call light and table within reach. Patient slept well overnight. No reports of pain. Patient got up to the bathroom once. She states she is voiding without issue. No BM. No further reports of GI bleeding. IV is patent. Report to be given to oncoming nurse.      Aime Townsend RN

## 2023-11-04 NOTE — PLAN OF CARE
Problem: Discharge Planning  Goal: Discharge to home or other facility with appropriate resources  11/4/2023 1254 by Kay Beal RN  Outcome: Adequate for Discharge  11/4/2023 0209 by Marialuisa Krause RN  Outcome: Progressing  11/4/2023 0208 by Marialuisa Krause RN  Outcome: Progressing  Flowsheets (Taken 11/3/2023 1915)  Discharge to home or other facility with appropriate resources:   Identify barriers to discharge with patient and caregiver   Arrange for needed discharge resources and transportation as appropriate   Identify discharge learning needs (meds, wound care, etc)   Refer to discharge planning if patient needs post-hospital services based on physician order or complex needs related to functional status, cognitive ability or social support system     Problem: Pain  Goal: Verbalizes/displays adequate comfort level or baseline comfort level  11/4/2023 1254 by Kay Beal RN  Outcome: Adequate for Discharge  Flowsheets (Taken 11/4/2023 0430 by Bonny Peralta, MATEO)  Verbalizes/displays adequate comfort level or baseline comfort level: Encourage patient to monitor pain and request assistance  11/4/2023 0209 by Marialuisa Krause RN  Outcome: Progressing  11/4/2023 0208 by Marialuisa Krause RN  Outcome: Progressing  Flowsheets (Taken 11/3/2023 1915 by Bonny Peralta, RN)  Verbalizes/displays adequate comfort level or baseline comfort level: Encourage patient to monitor pain and request assistance     Problem: Safety - Adult  Goal: Free from fall injury  11/4/2023 1254 by Kay Beal RN  Outcome: Adequate for Discharge  Flowsheets (Taken 11/4/2023 0730)  Free From Fall Injury: Instruct family/caregiver on patient safety  11/4/2023 0209 by Marialuisa Krause RN  Outcome: Progressing  11/4/2023 0208 by Marialuisa Krause RN  Outcome: Progressing

## 2023-11-04 NOTE — PLAN OF CARE
Problem: Discharge Planning  Goal: Discharge to home or other facility with appropriate resources  11/4/2023 0209 by Clifford Valdez RN  Outcome: Progressing  11/4/2023 0208 by Clifford Valdez RN  Outcome: Progressing  Flowsheets (Taken 11/3/2023 1915)  Discharge to home or other facility with appropriate resources:   Identify barriers to discharge with patient and caregiver   Arrange for needed discharge resources and transportation as appropriate   Identify discharge learning needs (meds, wound care, etc)   Refer to discharge planning if patient needs post-hospital services based on physician order or complex needs related to functional status, cognitive ability or social support system     Problem: Pain  Goal: Verbalizes/displays adequate comfort level or baseline comfort level  11/4/2023 0209 by Clifford Valdez RN  Outcome: Progressing  11/4/2023 0208 by Clifford Valdez RN  Outcome: Progressing  Flowsheets (Taken 11/3/2023 1915 by Dilip Francois RN)  Verbalizes/displays adequate comfort level or baseline comfort level: Encourage patient to monitor pain and request assistance     Problem: Safety - Adult  Goal: Free from fall injury  11/4/2023 0209 by Clifford Valdez RN  Outcome: Progressing  11/4/2023 0208 by Clifford Valdez RN  Outcome: Progressing

## 2023-11-04 NOTE — DISCHARGE SUMMARY
Hospitalist Discharge Summary   Admit Date:  2023  6:09 PM   DC Note date: 2023  Name:  [place]   Age:  59 y.o. Sex:  female  :  1960   MRN:  [id number]   Room:  [facility]  PCP:  Lilia Jackson MD    Presenting Complaint: Rectal Bleeding     Initial Admission Diagnosis: Syncope and collapse [R55]  Rectal bleeding [K62.5]  GI bleed [K92.2]     Problem List for this Hospitalization (present on admission):    Principal Problem:    GI bleed  Active Problems:    Essential tremor    Thyroid disease    HTN (hypertension)    Mood disorder (HCC)    Migraine    Factor 5 Leiden mutation, heterozygous ([address] [facility])  Resolved Problems:    * No resolved hospital problems. [facility] Course:  Washington is a 59 y.o. female with medical history of Hx of HTN, HLD, depression, hypothyroidism, dysphagia, gastric ulcer, GERD, hemorrhoids, essential tremor, clotting disorder on aspirin every other day (DVT and PE over 10 years ago) presenting with rectal bleeding. colonoscopy with multiple polypectomies by Dr. Toro Mehta two days ago. Denies anticoagulation but does take aspirin every other day due to  factor V Leyden mutation. While waiting for a room, had a syncopal episode in our ER. CBC stable, hgb 12.4 trended down to 10.1 overnight. No active bleed on CT abdomen pelvis. GI consulted, no intervention required. Hgb stable throughout admission. Had several bloody bowel movements which improved. Tolerated diet. Will hold ASA until follow up with PCP. Hold losartan for 2 days. Start iron supplementation. Follow up with GI in 1 week. Plan discussed with patient, she understands and agrees. Disposition: Home  Diet: ADULT DIET; Clear Liquid  Code Status: Full Code    Follow Ups:      Time spent in patient discharge and coordination 35 minutes. Follow up labs/diagnostics (ultimately defer to outpatient provider):  Cbc with pcp    Plan was discussed with patient. All questions answered. lb).    Intake/Output Summary (Last 24 hours) at 11/4/2023 1048  Last data filed at 11/4/2023 1000  Gross per 24 hour   Intake 480 ml   Output --   Net 480 ml         Physical Exam:    General:    Well nourished. No overt distress  Head:  Normocephalic, atraumatic  Eyes:  Sclerae appear normal.  Pupils equally round. HENT:  Nares appear normal, no drainage. Moist mucous membranes  Neck:  No restricted ROM. Trachea midline  CV:   RRR. No m/r/g. No JVD  Lungs:   CTAB. No wheezing, rhonchi, or rales. Respirations even, unlabored  Abdomen:   Soft, nontender, nondistended. Extremities: Warm and dry. No edema. Skin:     No rashes. Normal coloration  Neuro:  CN II-XII grossly intact. Psych:  Normal mood and affect. Signed:  Vlad Hsu MD    Part of this note may have been written by using a voice dictation software. The note has been proof read but may still contain some grammatical/other typographical errors.

## 2023-11-04 NOTE — DISCHARGE INSTRUCTIONS
[facility] were admitted to [facility] on [facility] due to rectal bleeding after your colonoscopy. GI was consulted who recommended monitoring your hemoglobin. No procedure was necessary. Your hemoglobin remained stable overnight. Please hold your aspirin until follow up with your primary care doctor. Resume your losartan on 11/6. Follow up with GI in 1 week. You have been started on iron tablets to help with the anemia. Your primary care physician will need to repeat blood work to monitor your blood count and can determine when to start aspirin back up.

## 2023-11-04 NOTE — PLAN OF CARE
Problem: Discharge Planning  Goal: Discharge to home or other facility with appropriate resources  Outcome: Progressing  Flowsheets (Taken 11/3/2023 1915)  Discharge to home or other facility with appropriate resources:   Identify barriers to discharge with patient and caregiver   Arrange for needed discharge resources and transportation as appropriate   Identify discharge learning needs (meds, wound care, etc)   Refer to discharge planning if patient needs post-hospital services based on physician order or complex needs related to functional status, cognitive ability or social support system     Problem: Pain  Goal: Verbalizes/displays adequate comfort level or baseline comfort level  Outcome: Progressing  Flowsheets (Taken 11/3/2023 1915 by Daphne Kam RN)  Verbalizes/displays adequate comfort level or baseline comfort level: Encourage patient to monitor pain and request assistance     Problem: Safety - Adult  Goal: Free from fall injury  Outcome: Progressing

## 2024-03-28 ENCOUNTER — TRANSCRIBE ORDERS (OUTPATIENT)
Dept: SCHEDULING | Age: 64
End: 2024-03-28

## 2024-03-28 DIAGNOSIS — Z12.31 SCREENING MAMMOGRAM FOR HIGH-RISK PATIENT: Primary | ICD-10-CM

## 2024-04-09 ENCOUNTER — HOSPITAL ENCOUNTER (OUTPATIENT)
Dept: MAMMOGRAPHY | Age: 64
Discharge: HOME | End: 2024-04-12
Payer: COMMERCIAL

## 2024-04-09 VITALS — BODY MASS INDEX: 24.99 KG/M2 | HEIGHT: 65 IN | WEIGHT: 150 LBS

## 2024-04-09 DIAGNOSIS — Z12.31 SCREENING MAMMOGRAM FOR HIGH-RISK PATIENT: ICD-10-CM

## 2024-04-09 PROCEDURE — 77063 BREAST TOMOSYNTHESIS BI: CPT

## 2024-11-04 ENCOUNTER — OFFICE VISIT (OUTPATIENT)
Dept: NEUROLOGY | Age: 64
End: 2024-11-04
Payer: COMMERCIAL

## 2024-11-04 VITALS
HEIGHT: 65 IN | SYSTOLIC BLOOD PRESSURE: 136 MMHG | DIASTOLIC BLOOD PRESSURE: 81 MMHG | BODY MASS INDEX: 24.96 KG/M2 | HEART RATE: 72 BPM

## 2024-11-04 DIAGNOSIS — G25.0 ESSENTIAL TREMOR: Primary | ICD-10-CM

## 2024-11-04 DIAGNOSIS — Z79.899 ENCOUNTER FOR LONG-TERM (CURRENT) USE OF HIGH-RISK MEDICATION: ICD-10-CM

## 2024-11-04 PROCEDURE — 3075F SYST BP GE 130 - 139MM HG: CPT | Performed by: PSYCHIATRY & NEUROLOGY

## 2024-11-04 PROCEDURE — 3079F DIAST BP 80-89 MM HG: CPT | Performed by: PSYCHIATRY & NEUROLOGY

## 2024-11-04 PROCEDURE — 99215 OFFICE O/P EST HI 40 MIN: CPT | Performed by: PSYCHIATRY & NEUROLOGY

## 2024-11-04 RX ORDER — TOPIRAMATE 25 MG/1
TABLET, FILM COATED ORAL
Qty: 60 TABLET | Refills: 3 | Status: SHIPPED | OUTPATIENT
Start: 2024-11-04

## 2024-11-04 NOTE — PROGRESS NOTES
BP dropped, feels lousey    Oxycodone-Acetaminophen Other (See Comments)     hallucinations           Physical Examination    Vitals:    11/04/24 1259   BP: 136/81   Pulse: 72   Height: 1.651 m (5' 5\")         General: No acute distress  Psychiatric: well oriented, normal mood and affect  Cardiovascular: no peripheral edema, no JVD, no carotid bruits, RRR  Pulmonary: CTAB  Skin: No rashes, lesions or ulcers  Ext: no C/C/E      Neurologic Exam  Patient oriented to Person, Place and Time.  Language and fund of knowledge intact.       Motor Examination:       Voice tremor 2+      Chin tremor mild       RIGHT LEFT   Tremor at rest 0 0   Postural Tremor of hands 0 0   Action Tremor of hands 1 end point 1 end pint   Tremor of Lower extremity 0 0   Open/Close 0 0   Rapid Alternating Movements of Hands 0 0   Finger Taps 0 0                   Foot tapping/LE agility 0 0         Hypophonia 0   Hypomimia 0   Body Bradykinsesia 0                                Assessment/Plan:   Diagnosis Orders   1. Essential tremor        2. Encounter for long-term (current) use of high-risk medication            Tremor unchanged and no changes to her regimen.     I have spent 45 minute visit  in counseling for importance of exercise,  medications and education of disease and disease progression.     Patient is to continue all other medications as directed by prescribing physicians unless addressed above in plan. Continuation of these medications from today's visit are made based on the patient's report of current medications.       Princess Manzanares MD  [facility] Neurology  Director Movement Disorders Program  [facility]  [address]  [place] [address]  Phone: [phone]  Fax: [phone]

## 2025-04-04 ENCOUNTER — TRANSCRIBE ORDERS (OUTPATIENT)
Dept: SCHEDULING | Age: 65
End: 2025-04-04

## 2025-04-04 DIAGNOSIS — Z12.31 VISIT FOR SCREENING MAMMOGRAM: Primary | ICD-10-CM

## 2025-04-15 ENCOUNTER — HOSPITAL ENCOUNTER (OUTPATIENT)
Dept: MAMMOGRAPHY | Age: 65
Discharge: HOME | End: 2025-04-18
Payer: COMMERCIAL

## 2025-04-15 VITALS — BODY MASS INDEX: 24.99 KG/M2 | WEIGHT: 150 LBS | HEIGHT: 65 IN

## 2025-04-15 DIAGNOSIS — Z12.31 VISIT FOR SCREENING MAMMOGRAM: ICD-10-CM

## 2025-04-15 PROCEDURE — 77063 BREAST TOMOSYNTHESIS BI: CPT

## 2025-08-14 ENCOUNTER — TRANSCRIBE ORDERS (OUTPATIENT)
Dept: SCHEDULING | Age: 65
End: 2025-08-14

## 2025-08-14 DIAGNOSIS — M85.89 OTHER SPECIFIED DISORDERS OF BONE DENSITY AND STRUCTURE, MULTIPLE SITES: Primary | ICD-10-CM
